# Patient Record
Sex: FEMALE | Race: WHITE | NOT HISPANIC OR LATINO | URBAN - METROPOLITAN AREA
[De-identification: names, ages, dates, MRNs, and addresses within clinical notes are randomized per-mention and may not be internally consistent; named-entity substitution may affect disease eponyms.]

---

## 2018-03-05 ENCOUNTER — EMERGENCY (EMERGENCY)
Facility: HOSPITAL | Age: 83
LOS: 0 days | Discharge: HOME | End: 2018-03-05
Attending: EMERGENCY MEDICINE

## 2018-03-05 VITALS
RESPIRATION RATE: 16 BRPM | HEART RATE: 83 BPM | SYSTOLIC BLOOD PRESSURE: 121 MMHG | OXYGEN SATURATION: 96 % | TEMPERATURE: 98 F | DIASTOLIC BLOOD PRESSURE: 68 MMHG

## 2018-03-05 VITALS
SYSTOLIC BLOOD PRESSURE: 161 MMHG | HEIGHT: 62 IN | DIASTOLIC BLOOD PRESSURE: 71 MMHG | HEART RATE: 81 BPM | RESPIRATION RATE: 20 BRPM | TEMPERATURE: 98 F | OXYGEN SATURATION: 97 % | WEIGHT: 134.92 LBS
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
## 2018-03-05 DIAGNOSIS — S42.142A DISPLACED FRACTURE OF GLENOID CAVITY OF SCAPULA, LEFT SHOULDER, INITIAL ENCOUNTER FOR CLOSED FRACTURE: ICD-10-CM

## 2018-03-05 DIAGNOSIS — S42.292A OTHER DISPLACED FRACTURE OF UPPER END OF LEFT HUMERUS, INITIAL ENCOUNTER FOR CLOSED FRACTURE: ICD-10-CM
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
## 2018-03-05 RX ORDER — MORPHINE SULFATE 50 MG/1
4 CAPSULE, EXTENDED RELEASE ORAL ONCE
Qty: 0 | Refills: 0 | Status: DISCONTINUED | OUTPATIENT
Start: 2018-03-05 | End: 2018-03-05

## 2018-03-05 RX ADMIN — MORPHINE SULFATE 4 MILLIGRAM(S): 50 CAPSULE, EXTENDED RELEASE ORAL at 08:56

## 2018-03-05 NOTE — ED PROVIDER NOTE - CARE PLAN
Principal Discharge DX:	Humerus head fracture  Secondary Diagnosis:	Glenoid fracture of shoulder, left, closed, initial encounter  Secondary Diagnosis:	Fall

## 2018-03-05 NOTE — ED ADULT TRIAGE NOTE - CHIEF COMPLAINT QUOTE
BIBA ems states "slip and fall complaining of left upper arm shoulder pain, down for three hours sitting on floor".

## 2018-03-05 NOTE — ED PROVIDER NOTE - PROGRESS NOTE DETAILS
hume Results of Xray and CT discussed with patient and sister in law. Patient will follow-up with Orthopedics for shoulder fx; follow-up with PMD for mass/ opacity. d/w orthopedics regarding humeral fracture. Will d/c home f/u out pt

## 2018-03-05 NOTE — ED PROVIDER NOTE - MEDICAL DECISION MAKING DETAILS
humeral fx noted, and hilar finding d/w pt  All incidental findings were discussed with patient and patient needs to follow-up with primary care physician for further management and treatment. All reports given to patient and/or family.

## 2018-03-05 NOTE — ED PROVIDER NOTE - OBJECTIVE STATEMENT
85 y/o female Melanoma R eye, legally blind presents to the ED s/p "I was walking to the bathroom this morning and I lost my footing and I fell injuring my left shoulder. I was unable to get due to pain. " no LOC/ head trauma/ dizziness/ CP/ SOB

## 2018-12-03 ENCOUNTER — OUTPATIENT (OUTPATIENT)
Dept: OUTPATIENT SERVICES | Facility: HOSPITAL | Age: 83
LOS: 1 days | Discharge: HOME | End: 2018-12-03

## 2018-12-03 DIAGNOSIS — H54.8 LEGAL BLINDNESS, AS DEFINED IN USA: ICD-10-CM

## 2018-12-03 DIAGNOSIS — Z00.00 ENCOUNTER FOR GENERAL ADULT MEDICAL EXAMINATION WITHOUT ABNORMAL FINDINGS: ICD-10-CM

## 2018-12-03 DIAGNOSIS — D72.829 ELEVATED WHITE BLOOD CELL COUNT, UNSPECIFIED: ICD-10-CM

## 2020-06-04 ENCOUNTER — EMERGENCY (EMERGENCY)
Facility: HOSPITAL | Age: 85
LOS: 1 days | Discharge: HOME | End: 2020-06-04
Attending: EMERGENCY MEDICINE | Admitting: EMERGENCY MEDICINE
Payer: MEDICARE

## 2020-06-04 VITALS
WEIGHT: 130.07 LBS | HEART RATE: 73 BPM | RESPIRATION RATE: 16 BRPM | HEIGHT: 63 IN | DIASTOLIC BLOOD PRESSURE: 77 MMHG | TEMPERATURE: 98 F | SYSTOLIC BLOOD PRESSURE: 123 MMHG | OXYGEN SATURATION: 100 %

## 2020-06-04 DIAGNOSIS — S09.90XA UNSPECIFIED INJURY OF HEAD, INITIAL ENCOUNTER: ICD-10-CM

## 2020-06-04 PROCEDURE — U0003: CPT

## 2020-06-04 PROCEDURE — 70450 CT HEAD/BRAIN W/O DYE: CPT | Mod: 26

## 2020-06-04 PROCEDURE — 70450 CT HEAD/BRAIN W/O DYE: CPT

## 2020-06-04 PROCEDURE — 99284 EMERGENCY DEPT VISIT MOD MDM: CPT | Mod: CS

## 2020-06-04 PROCEDURE — 99284 EMERGENCY DEPT VISIT MOD MDM: CPT | Mod: 25

## 2020-06-04 RX ORDER — SENNA PLUS 8.6 MG/1
1 TABLET ORAL AT BEDTIME
Refills: 0 | Status: DISCONTINUED | OUTPATIENT
Start: 2020-06-04 | End: 2020-06-04

## 2020-06-04 RX ORDER — METOPROLOL TARTRATE 50 MG
25 TABLET ORAL ONCE
Refills: 0 | Status: COMPLETED | OUTPATIENT
Start: 2020-06-04 | End: 2020-06-04

## 2020-06-04 RX ORDER — SENNA PLUS 8.6 MG/1
2 TABLET ORAL AT BEDTIME
Refills: 0 | Status: DISCONTINUED | OUTPATIENT
Start: 2020-06-04 | End: 2020-06-08

## 2020-06-04 RX ADMIN — Medication 25 MILLIGRAM(S): at 19:15

## 2020-06-04 RX ADMIN — SENNA PLUS 2 TABLET(S): 8.6 TABLET ORAL at 19:15

## 2020-06-04 NOTE — ED ADULT NURSE REASSESSMENT NOTE - NS ED NURSE REASSESS COMMENT FT1
Pt. stiletted X 3 throughout the evening. voiding clear yellow urine in bedpan. Pt hydrated and skin care given. VSS. Evening meds given as Rx.   Pt remains waiting for COVID test results for D/C back to long term facility.

## 2020-06-04 NOTE — ED ADULT NURSE NOTE - OBJECTIVE STATEMENT
Pt was BIBA s/p fall from wheelchair, EMS reports that assisted living staff reported left side head injury with no LOC. No obvious injury noted. No other reports of pain or injury to another area of body.

## 2020-06-04 NOTE — ED PROVIDER NOTE - CLINICAL SUMMARY MEDICAL DECISION MAKING FREE TEXT BOX
88 y/o F Melanoma R eye, legally blind, HTN, on ASA 81mg was BIB EMS from [facility] for head injury. Patient fell while trying to get out of her wheelchair, the staff noticed a bump on the left side of her head and sent to her to the ED. No LOC reported. PE as noted above. head CT pending. Covid swab sent 88 y/o F Melanoma R eye, legally blind, HTN, CVA on ASA 81mg was BIB EMS from [facility] for head injury. Patient fell while trying to get out of her wheelchair, the staff noticed a bump on the left side of her head and sent to her to the ED. No LOC reported. PE as noted above. head CT pending. Covid swab sent 88 y/o F Melanoma R eye, legally blind, HTN, CVA on ASA 81mg was BIB EMS from [facility] for head injury. Patient fell while trying to get out of her wheelchair, the staff noticed a bump on the left side of her head and sent to her to the ED. No LOC reported. PE as noted above. head CT negative Covid swab sent. awaiting results, prior to dispo back to NH

## 2020-06-04 NOTE — ED PROVIDER NOTE - ATTENDING CONTRIBUTION TO CARE
Wes with SANDRA Salvador. 88 y/o F Melanoma R eye, legally blind, HTN, on ASA 81mg was BIB EMS from [facility] for head injury. Patient fell while trying to get out of her wheelchair, the staff noticed a bump on the left side of her head and sent to her to the ED. No LOC reported. PE as noted above. head CT pending. Covid swab sent.   I performed a face to face bedside interview with patient regarding history of present illness, review of symptoms and past medical history. I completed an independent physical exam.  I have discussed the patient's plan of care with Physician Assistant (PA). I agree with note as stated above, having amended the EMR as needed to reflect my findings.   This includes History of Present Illness, HIV, Past Medical/Surgical/Family/Social History, Allergies and Home Medications, Review of Systems, Physical Exam, and any Progress Notes during the time I functioned as the attending physician for this patient.

## 2020-06-04 NOTE — GOALS OF CARE CONVERSATION - ADVANCED CARE PLANNING - CONVERSATION DETAILS
Pt. is sent to [facility] ED s/p fall at [facility]. Pt. has hx. of CVA, dementia. She does not have capacity to make medical decisions. Called HCP, sister in law Alejandra Monique ( who is also a ). Sister in Law states that she does not think it would improve patient's quality of life to attempt resuscitation or intubation. She gave verbal consent for MOLST: DNR/I.  Copy in chart, original will be sent to [facility] with patient.

## 2020-06-04 NOTE — ED ADULT TRIAGE NOTE - CHIEF COMPLAINT QUOTE
pt sent from [facility] for fall from standing position while trying to get out of wheelchair. pt hit left side of her head, on aspirin

## 2020-06-04 NOTE — ED ADULT NURSE REASSESSMENT NOTE - NS ED NURSE REASSESS COMMENT FT1
Pt. resting comfortably, safety/fall precautions maintained. Pt. awaiting for COVID test results and transfer back to facility.

## 2020-06-04 NOTE — ED PROVIDER NOTE - OBJECTIVE STATEMENT
88 y/o F Melanoma R eye, legally blind, HTN, on ASA 81mg was BIB EMS from [facility] for head injury. Patient fell while trying to get out of her wheelchair, the staff noticed a bump on the left side of her head and sent to her to the ED. No LOC reported. 88 y/o F Melanoma R eye, legally blind, HTN, CVA on ASA 81mg was BIB EMS from [facility] for head injury. Patient fell while trying to get out of her wheelchair, the staff noticed a bump on the left side of her head and sent to her to the ED. No LOC reported.

## 2020-06-04 NOTE — ED ADULT NURSE NOTE - NSIMPLEMENTINTERV_GEN_ALL_ED
Implemented All Fall with Harm Risk Interventions:  [place] to call system. Call bell, personal items and telephone within reach. Instruct patient to call for assistance. Room bathroom lighting operational. Non-slip footwear when patient is off stretcher. Physically safe environment: no spills, clutter or unnecessary equipment. Stretcher in lowest position, wheels locked, appropriate side rails in place. Provide visual cue, wrist band, yellow gown, etc. Monitor gait and stability. Monitor for mental status changes and reorient to person, place, and time. Review medications for side effects contributing to fall risk. Reinforce activity limits and safety measures with patient and family. Provide visual clues: red socks.

## 2020-06-04 NOTE — ED PROVIDER NOTE - PATIENT PORTAL LINK FT
You can access the FollowMyHealth Patient Portal offered by [facility] by registering at the following website: [email or web address]. By joining [employer]’s FollowMyHealth portal, you will also be able to view your health information using other applications (apps) compatible with our system.

## 2020-06-05 VITALS
DIASTOLIC BLOOD PRESSURE: 75 MMHG | SYSTOLIC BLOOD PRESSURE: 141 MMHG | OXYGEN SATURATION: 100 % | HEART RATE: 78 BPM | RESPIRATION RATE: 18 BRPM | TEMPERATURE: 98 F

## 2020-06-05 LAB — SARS-COV-2 RNA SPEC QL NAA+PROBE: SIGNIFICANT CHANGE UP

## 2020-06-05 NOTE — ED ADULT NURSE REASSESSMENT NOTE - NS ED NURSE REASSESS COMMENT FT1
Call made to [facility] for update on covid swab results. Swab arrived at laboratory at 2200. Swab still awaiting testing. Test itself with take 2.5 hours to complete. Laboratory aware that test is to be expedited.

## 2020-06-05 NOTE — ED ADULT NURSE REASSESSMENT NOTE - NS ED NURSE REASSESS COMMENT FT1
Patient given water, patient voided. Patient A&Ox2 at this time. Patient aware that she is awaiting test results before returning to facility. Patient adjusted in bed and made comfortable for sleeping. Posey alarm in place. Environment checked for safety.

## 2021-05-05 PROBLEM — Z00.00 ENCOUNTER FOR PREVENTIVE HEALTH EXAMINATION: Status: ACTIVE | Noted: 2021-05-05

## 2021-05-12 ENCOUNTER — APPOINTMENT (OUTPATIENT)
Dept: MRI IMAGING | Facility: HOSPITAL | Age: 86
End: 2021-05-12

## 2021-05-25 ENCOUNTER — APPOINTMENT (OUTPATIENT)
Dept: MRI IMAGING | Facility: HOSPITAL | Age: 86
End: 2021-05-25
Payer: MEDICARE

## 2021-05-25 ENCOUNTER — OUTPATIENT (OUTPATIENT)
Dept: OUTPATIENT SERVICES | Facility: HOSPITAL | Age: 86
LOS: 1 days | End: 2021-05-25
Payer: MEDICARE

## 2021-05-25 DIAGNOSIS — Z00.8 ENCOUNTER FOR OTHER GENERAL EXAMINATION: ICD-10-CM

## 2021-05-25 PROCEDURE — 72195 MRI PELVIS W/O DYE: CPT | Mod: 26,MH

## 2021-05-25 PROCEDURE — 72195 MRI PELVIS W/O DYE: CPT

## 2022-09-21 ENCOUNTER — INPATIENT (INPATIENT)
Facility: HOSPITAL | Age: 87
LOS: 4 days | Discharge: NURSING, REHAB OR LONG-TERM CARE | DRG: 640 | End: 2022-09-26
Attending: FAMILY MEDICINE | Admitting: STUDENT IN AN ORGANIZED HEALTH CARE EDUCATION/TRAINING PROGRAM
Payer: MEDICARE

## 2022-09-21 VITALS
RESPIRATION RATE: 18 BRPM | OXYGEN SATURATION: 97 % | SYSTOLIC BLOOD PRESSURE: 158 MMHG | TEMPERATURE: 98 F | HEART RATE: 77 BPM | HEIGHT: 63 IN | DIASTOLIC BLOOD PRESSURE: 75 MMHG

## 2022-09-21 DIAGNOSIS — I63.9 CEREBRAL INFARCTION, UNSPECIFIED: ICD-10-CM

## 2022-09-21 LAB
ALBUMIN SERPL ELPH-MCNC: 3.7 G/DL — SIGNIFICANT CHANGE UP (ref 3.3–5)
ALP SERPL-CCNC: 29 U/L — LOW (ref 40–120)
ALT FLD-CCNC: 15 U/L — SIGNIFICANT CHANGE UP (ref 10–45)
ANION GAP SERPL CALC-SCNC: 14 MMOL/L — SIGNIFICANT CHANGE UP (ref 5–17)
APPEARANCE UR: CLEAR — SIGNIFICANT CHANGE UP
APTT BLD: 30.2 SEC — SIGNIFICANT CHANGE UP (ref 27.5–35.5)
AST SERPL-CCNC: 26 U/L — SIGNIFICANT CHANGE UP (ref 10–40)
BACTERIA # UR AUTO: ABNORMAL /HPF
BASOPHILS # BLD AUTO: 0.03 K/UL — SIGNIFICANT CHANGE UP (ref 0–0.2)
BASOPHILS NFR BLD AUTO: 0.5 % — SIGNIFICANT CHANGE UP (ref 0–2)
BILIRUB SERPL-MCNC: 1.2 MG/DL — SIGNIFICANT CHANGE UP (ref 0.2–1.2)
BILIRUB UR-MCNC: NEGATIVE — SIGNIFICANT CHANGE UP
BUN SERPL-MCNC: 29 MG/DL — HIGH (ref 7–23)
CALCIUM SERPL-MCNC: 9.2 MG/DL — SIGNIFICANT CHANGE UP (ref 8.4–10.5)
CHLORIDE SERPL-SCNC: 99 MMOL/L — SIGNIFICANT CHANGE UP (ref 96–108)
CO2 SERPL-SCNC: 23 MMOL/L — SIGNIFICANT CHANGE UP (ref 22–31)
COLOR SPEC: YELLOW — SIGNIFICANT CHANGE UP
CREAT SERPL-MCNC: 0.98 MG/DL — SIGNIFICANT CHANGE UP (ref 0.5–1.3)
DIFF PNL FLD: ABNORMAL
EGFR: 55 ML/MIN/1.73M2 — LOW
EOSINOPHIL # BLD AUTO: 0.01 K/UL — SIGNIFICANT CHANGE UP (ref 0–0.5)
EOSINOPHIL NFR BLD AUTO: 0.2 % — SIGNIFICANT CHANGE UP (ref 0–6)
EPI CELLS # UR: SIGNIFICANT CHANGE UP
GLUCOSE BLDC GLUCOMTR-MCNC: 137 MG/DL — HIGH (ref 70–99)
GLUCOSE SERPL-MCNC: 65 MG/DL — LOW (ref 70–99)
GLUCOSE UR QL: 100 MG/DL
HCT VFR BLD CALC: 37.7 % — SIGNIFICANT CHANGE UP (ref 34.5–45)
HGB BLD-MCNC: 12.4 G/DL — SIGNIFICANT CHANGE UP (ref 11.5–15.5)
IMM GRANULOCYTES NFR BLD AUTO: 0.5 % — SIGNIFICANT CHANGE UP (ref 0–0.9)
INR BLD: 1 RATIO — SIGNIFICANT CHANGE UP (ref 0.88–1.16)
KETONES UR-MCNC: ABNORMAL
LEUKOCYTE ESTERASE UR-ACNC: ABNORMAL
LYMPHOCYTES # BLD AUTO: 1.02 K/UL — SIGNIFICANT CHANGE UP (ref 1–3.3)
LYMPHOCYTES # BLD AUTO: 17 % — SIGNIFICANT CHANGE UP (ref 13–44)
MCHC RBC-ENTMCNC: 32 PG — SIGNIFICANT CHANGE UP (ref 27–34)
MCHC RBC-ENTMCNC: 32.9 GM/DL — SIGNIFICANT CHANGE UP (ref 32–36)
MCV RBC AUTO: 97.2 FL — SIGNIFICANT CHANGE UP (ref 80–100)
MONOCYTES # BLD AUTO: 0.43 K/UL — SIGNIFICANT CHANGE UP (ref 0–0.9)
MONOCYTES NFR BLD AUTO: 7.2 % — SIGNIFICANT CHANGE UP (ref 2–14)
NEUTROPHILS # BLD AUTO: 4.47 K/UL — SIGNIFICANT CHANGE UP (ref 1.8–7.4)
NEUTROPHILS NFR BLD AUTO: 74.6 % — SIGNIFICANT CHANGE UP (ref 43–77)
NITRITE UR-MCNC: POSITIVE
NRBC # BLD: 0 /100 WBCS — SIGNIFICANT CHANGE UP (ref 0–0)
PH UR: 6.5 — SIGNIFICANT CHANGE UP (ref 5–8)
PLATELET # BLD AUTO: 207 K/UL — SIGNIFICANT CHANGE UP (ref 150–400)
POTASSIUM SERPL-MCNC: 4.1 MMOL/L — SIGNIFICANT CHANGE UP (ref 3.5–5.3)
POTASSIUM SERPL-SCNC: 4.1 MMOL/L — SIGNIFICANT CHANGE UP (ref 3.5–5.3)
PROT SERPL-MCNC: 7.2 G/DL — SIGNIFICANT CHANGE UP (ref 6–8.3)
PROT UR-MCNC: 100
PROTHROM AB SERPL-ACNC: 11.6 SEC — SIGNIFICANT CHANGE UP (ref 10.5–13.4)
RBC # BLD: 3.88 M/UL — SIGNIFICANT CHANGE UP (ref 3.8–5.2)
RBC # FLD: 13.2 % — SIGNIFICANT CHANGE UP (ref 10.3–14.5)
RBC CASTS # UR COMP ASSIST: ABNORMAL /HPF (ref 0–4)
SARS-COV-2 RNA SPEC QL NAA+PROBE: SIGNIFICANT CHANGE UP
SODIUM SERPL-SCNC: 136 MMOL/L — SIGNIFICANT CHANGE UP (ref 135–145)
SP GR SPEC: 1.01 — SIGNIFICANT CHANGE UP (ref 1.01–1.02)
TROPONIN I, HIGH SENSITIVITY RESULT: 7.9 NG/L — SIGNIFICANT CHANGE UP
UROBILINOGEN FLD QL: NEGATIVE — SIGNIFICANT CHANGE UP
WBC # BLD: 5.99 K/UL — SIGNIFICANT CHANGE UP (ref 3.8–10.5)
WBC # FLD AUTO: 5.99 K/UL — SIGNIFICANT CHANGE UP (ref 3.8–10.5)
WBC UR QL: ABNORMAL /HPF (ref 0–5)

## 2022-09-21 PROCEDURE — 70498 CT ANGIOGRAPHY NECK: CPT | Mod: 26,MA

## 2022-09-21 PROCEDURE — 93010 ELECTROCARDIOGRAM REPORT: CPT

## 2022-09-21 PROCEDURE — 70496 CT ANGIOGRAPHY HEAD: CPT | Mod: 26,MA

## 2022-09-21 PROCEDURE — 0042T: CPT | Mod: MA

## 2022-09-21 PROCEDURE — 99223 1ST HOSP IP/OBS HIGH 75: CPT

## 2022-09-21 PROCEDURE — 99285 EMERGENCY DEPT VISIT HI MDM: CPT

## 2022-09-21 RX ORDER — LEVOTHYROXINE SODIUM 125 MCG
0 TABLET ORAL
Qty: 0 | Refills: 0 | DISCHARGE

## 2022-09-21 RX ORDER — ASPIRIN/CALCIUM CARB/MAGNESIUM 324 MG
81 TABLET ORAL DAILY
Refills: 0 | Status: DISCONTINUED | OUTPATIENT
Start: 2022-09-21 | End: 2022-09-23

## 2022-09-21 RX ORDER — DEXTROSE 50 % IN WATER 50 %
50 SYRINGE (ML) INTRAVENOUS ONCE
Refills: 0 | Status: COMPLETED | OUTPATIENT
Start: 2022-09-21 | End: 2022-09-21

## 2022-09-21 RX ORDER — METOPROLOL TARTRATE 50 MG
12.5 TABLET ORAL
Refills: 0 | Status: DISCONTINUED | OUTPATIENT
Start: 2022-09-21 | End: 2022-09-26

## 2022-09-21 RX ORDER — SODIUM CHLORIDE 9 MG/ML
1000 INJECTION, SOLUTION INTRAVENOUS
Refills: 0 | Status: COMPLETED | OUTPATIENT
Start: 2022-09-21 | End: 2022-09-22

## 2022-09-21 RX ORDER — OXYBUTYNIN CHLORIDE 5 MG
0 TABLET ORAL
Qty: 0 | Refills: 0 | DISCHARGE

## 2022-09-21 RX ORDER — CELECOXIB 200 MG/1
0 CAPSULE ORAL
Qty: 0 | Refills: 0 | DISCHARGE

## 2022-09-21 RX ORDER — SENNA PLUS 8.6 MG/1
2 TABLET ORAL AT BEDTIME
Refills: 0 | Status: DISCONTINUED | OUTPATIENT
Start: 2022-09-21 | End: 2022-09-26

## 2022-09-21 RX ORDER — ASPIRIN/CALCIUM CARB/MAGNESIUM 324 MG
300 TABLET ORAL ONCE
Refills: 0 | Status: COMPLETED | OUTPATIENT
Start: 2022-09-21 | End: 2022-09-21

## 2022-09-21 RX ORDER — LEVOTHYROXINE SODIUM 125 MCG
75 TABLET ORAL DAILY
Refills: 0 | Status: DISCONTINUED | OUTPATIENT
Start: 2022-09-21 | End: 2022-09-23

## 2022-09-21 RX ORDER — ASPIRIN/CALCIUM CARB/MAGNESIUM 324 MG
1 TABLET ORAL
Qty: 0 | Refills: 0 | DISCHARGE

## 2022-09-21 RX ORDER — OXYBUTYNIN CHLORIDE 5 MG
5 TABLET ORAL
Refills: 0 | Status: DISCONTINUED | OUTPATIENT
Start: 2022-09-21 | End: 2022-09-26

## 2022-09-21 RX ORDER — METOPROLOL TARTRATE 50 MG
0 TABLET ORAL
Qty: 0 | Refills: 0 | DISCHARGE

## 2022-09-21 RX ADMIN — Medication 300 MILLIGRAM(S): at 19:21

## 2022-09-21 RX ADMIN — Medication 50 MILLILITER(S): at 15:27

## 2022-09-21 NOTE — PROVIDER CONTACT NOTE (HYPOGLYCEMIA EVENT) - NS PROVIDER CONTACT BACKGROUND-HYPO
Age: 89y    Gender: Female    POCT Blood Glucose:  119 mg/dL (09-21-22 @ 15:57)  53 mg/dL (09-21-22 @ 15:27)  62 mg/dL (09-21-22 @ 15:25)      eMAR:dextrose 50% Injectable   50 milliLiter(s) IV Push (09-21-22 @ 15:27)    Stroke code hypoglycemic. Medication administered as per MD order.

## 2022-09-21 NOTE — ED ADULT NURSE NOTE - CHIEF COMPLAINT QUOTE
Patient BIB EMS from NH with new onset AMS and aphasia, last known well 14:15. Finger stick 82 per EMS, 62 in triage and then 53 in CT scan. Code stroke called as per provider. Patient also had recent sodium imbalance per EMS.    *** AS PER [facility] - PATIENT LAST KNOWN WELL ACTUALLY 7:30AM ***

## 2022-09-21 NOTE — ED PROVIDER NOTE - CLINICAL SUMMARY MEDICAL DECISION MAKING FREE TEXT BOX
89 y f bib ems cc unable to speak not verbal as per mack RN in [facility]  last known well 7.30 am in the morning noted 11 am  fs 53 ms improved after d50 weakness R UL R LL persists ct brain cta head neck no new ischemia , no major blood vs blockage   dr cleary informed rectal asa pt failed dysphagia screen

## 2022-09-21 NOTE — H&P ADULT - NSHPPHYSICALEXAM_GEN_ALL_CORE
Vital Signs (24 Hrs):  T(C): 37.1 (09-21-22 @ 22:33), Max: 37.1 (09-21-22 @ 22:33)  HR: 110 (09-21-22 @ 22:33) (77 - 110)  BP: 159/92 (09-21-22 @ 22:33) (158/75 - 164/79)  RR: 18 (09-21-22 @ 22:33) (16 - 18)  SpO2: 97% (09-21-22 @ 22:33) (97% - 98%)  Daily Height in cm: 160.02 (21 Sep 2022 15:26)

## 2022-09-21 NOTE — ED ADULT NURSE REASSESSMENT NOTE - NS ED NURSE REASSESS COMMENT FT1
Received report from previous RN. Pt. at current baseline confused.  Pt. following some basic commands.  Speech slurred and incomprehensible.  Pt. pending admit orders.

## 2022-09-21 NOTE — ED ADULT NURSE NOTE - OBJECTIVE STATEMENT
Assumed pt care for a 89 yr old female alerted, brought in by EMS complaining of altered mental status since 240pm. As per EMS staff at [facility] report pt is able to speak and since then has not been able to speak. Pt has a GCS of 13, NIH of 2. Finger stick of 53. Meds given as per Order. Stroke code

## 2022-09-21 NOTE — H&P ADULT - NEUROLOGICAL
cranial nerves II-XII intact/sensation intact cranial nerves II-XII intact/sensation intact/responds to pain/responds to verbal commands/deep reflexes intact

## 2022-09-21 NOTE — ED PROVIDER NOTE - OBJECTIVE STATEMENT
89 y f philipp ems cc unable to speak not verbal as per mack RN in [facility]  last known well 7.30 am in the morning noted 11 am

## 2022-09-21 NOTE — ED ADULT TRIAGE NOTE - CHIEF COMPLAINT QUOTE
Patient BIB EMS from NH with new onset AMS and aphasia, last known well 14:15. Finger stick 82 per EMS, 62 in triage and then 53 in CT scan. Code stroke called as per provider. Patient also had recent sodium imbalance per EMS. Yes Patient BIB EMS from NH with new onset AMS and aphasia, last known well 14:15. Finger stick 82 per EMS, 62 in triage and then 53 in CT scan. Code stroke called as per provider. Patient also had recent sodium imbalance per EMS.    *** AS PER [facility] - PATIENT LAST KNOWN WELL ACTUALLY 7:30AM ***

## 2022-09-21 NOTE — H&P ADULT - HISTORY OF PRESENT ILLNESS
89 y/o F w/ HTN, Hypothyroidism, Dementia, BIBEMS, from NH because of altered MS, lethargy, not answering questions (pt usually verbal).  Pt was last known well at 7:30 AM.  FS was 82.  CT head, CTA head/neck w/ no acute stroke. While in the ED, pt noted to have FS  89 y/o F w/ HTN, Hypothyroidism, Dementia, BIBEMS, from NH because of altered MS, lethargy, not answering questions (pt usually verbal).  Pt was last known well at 7:30 AM.  FS was 82.  CT head, CTA head/neck w/ no acute stroke nor occlusion. While in the ED, pt noted to have FS of 53, was given an amp of D50.  Pt noted to be more responsive.  89 y/o F w/ HTN, Hypothyroidism, Dementia, BIBEMS, from NH because of altered MS, lethargy, not answering questions (pt usually verbal).  Pt was last known well at 7:30 AM.  FS was 82.  CT head, CTA head/neck w/ no acute stroke nor occlusion. While in the ED, pt noted to have FS of 53, was given an amp of D50.  Pt noted to be more responsive.   NIHSS score is 9. 89 y/o F w/ HTN, HLD, Hypothyroidism, Dementia, BIBEMS, from NH because of altered MS, lethargy, not answering questions (pt usually verbal).  Pt was last known well at 7:30 AM.  FS was 82.  CT head, CTA head/neck w/ no acute stroke nor occlusion. While in the ED, pt noted to have FS of 53, was given an amp of D50.  Pt noted to be more responsive.   NIHSS score is 9.

## 2022-09-21 NOTE — ED PROVIDER NOTE - PHYSICAL EXAMINATION
used General:     NAD, ill appearing confused elderly frail no distress  Head:     NC/AT, EOMI, oral mucosa moist  Neck:     trachea midline  Lungs:     CTA b/l, no w/r/r  CVS:     S1S2, RRR, no m/g/r  Abd:     +BS, s/nt/nd, no organomegaly  Ext:    2+ radial and pedal pulses, no c/c/e  Neuro:  lethargic drowsy responsive only to painful stimuli disoriented weakness R UL R LL   adter d50 iv pt awake minimally verbally responsive weakness R UL R LL persists

## 2022-09-21 NOTE — H&P ADULT - ASSESSMENT
89 y/o F w/ HTN, Hypothyroidism, Dementia, BIBEMS, from NH because of altered MS, lethargy, not answering questions (pt usually verbal).  Pt was last known well at 7:30 AM.  FS was 82.  CT head, CTA head/neck w/ no acute stroke nor occlusion. While in the ED, pt noted to have FS of 53, was given an amp of D50.  Pt noted to be more responsive.   Urinalysis +ve for nit, leuk est.      89 y/o F w/ HTN, Hypothyroidism, Dementia, BIBEMS, from NH because of altered MS, lethargy, not answering questions (pt usually verbal).  Pt was last known well at 7:30 AM.  FS was 82.  CT head, CTA head/neck w/ no acute stroke nor occlusion. While in the ED, pt noted to have FS of 53, was given an amp of D50.  Pt noted to be more responsive.   Urinalysis +ve for nit, leuk est.    Altered MS  CVA, hypoglycemia  Possible UTI  Pt with Dementia, hypertension, hypothyroidism    Admit  Neuro checks every 6 hours  Speech swallow eval, aspiration precautions  Gentle IV hydration  FFup labs  Cont ASA, statin 40 mg/day  Check lipid profile, TSH  Cont current meds: Metoprolol, levothyroxine  Empiric IV Ceftriaxone pending cultures  Neuro consult - Dr Kelli PENA - Pt has MOLST and is a DNR      89 y/o F w/ HTN, Hypothyroidism, Dementia, BIBEMS, from NH because of altered MS, lethargy, not answering questions (pt usually verbal).  Pt was last known well at 7:30 AM.  FS was 82.  CT head, CTA head/neck w/ no acute stroke nor occlusion. While in the ED, pt noted to have FS of 53, was given an amp of D50.  Pt noted to be more responsive.   Urinalysis +ve for nit, leuk est.    Altered MS  CVA, hypoglycemia  Possible UTI  Pt with Dementia, hypertension, hypothyroidism    Admit  Neuro checks every 6 hours  Speech swallow eval, aspiration precautions  Gentle IV hydration  FFup labs  Cont ASA, statin 40 mg/day  Check lipid profile, TSH  Cont current meds: Metoprolol, levothyroxine  Empiric IV Ceftriaxone pending cultures  Neuro consult   GOC - Pt has MOLST and is a DNR  DVT prophylaxis      89 y/o F w/ HTN, Hypothyroidism, Dementia, BIBEMS, from NH because of altered MS, lethargy, not answering questions (pt usually verbal).  Pt was last known well at 7:30 AM.  FS was 82.  CT head, CTA head/neck w/ no acute stroke nor occlusion. While in the ED, pt noted to have FS of 53, was given an amp of D50.  Pt noted to be more responsive.   Urinalysis +ve for nit, leuk est.    Altered MS  CVA, hypoglycemia  Possible UTI  Pt with Dementia, hypertension, hypothyroidism, HLD    Admit  Neuro checks every 6 hours  Speech swallow eval, aspiration precautions  Gentle IV hydration  FFup labs (pt had recent lipid panel)  Echo  Cont ASA, statin 40 mg/day  Cont current meds: Metoprolol, levothyroxine  Empiric IV Ceftriaxone pending cultures  Neuro consult   GOC - Pt has MOLST and is a DNR  DVT prophylaxis      89 y/o F w/ HTN, Hypothyroidism, Dementia, BIBEMS, from NH because of altered MS, lethargy, not answering questions (pt usually verbal).  Pt was last known well at 7:30 AM.  FS was 82.  CT head, CTA head/neck w/ no acute stroke nor occlusion. While in the ED, pt noted to have FS of 53, was given an amp of D50.  Pt noted to be more responsive.   Urinalysis +ve for nit, leuk est.    Altered MS  CVA, hypoglycemia  Possible UTI  Pt with Dementia, hypertension, hypothyroidism, HLD    Admit  Neuro checks every 6 hours  Speech swallow eval, aspiration precautions  Gentle IV hydration  FFup labs (pt had recent lipid panel), check TSH  Echo  Cont ASA, statin 40 mg/day  Cont current meds: Metoprolol, levothyroxine  Empiric IV Ceftriaxone pending cultures  Neuro consult   GOC - Pt has MOLST and is a DNR  DVT prophylaxis      89 y/o F w/ HTN, Hypothyroidism, Dementia, BIBEMS, from NH because of altered MS, lethargy, not answering questions (pt usually verbal).  Pt was last known well at 7:30 AM.  FS was 82.  CT head, CTA head/neck w/ no acute stroke nor occlusion. While in the ED, pt noted to have FS of 53, was given an amp of D50.  Pt noted to be more responsive.   Urinalysis +ve for nit, leuk est.    Altered MS  CVA, hypoglycemia  Possible UTI  Pt with Dementia, hypertension, hypothyroidism, HLD    Admit  Neuro checks every 6 hours  Speech swallow eval, aspiration precautions  Gentle IV hydration  FFup labs (pt had recent lipid panel), check TSH  Echo  Cont ASA, statin 40 mg/day  Cont current meds: Metoprolol, levothyroxine  Empiric IV Ceftriaxone pending cultures  Neuro consult - RDr Roosevelt - notified  GOC - Pt has MOLST and is a DNR  DVT prophylaxis

## 2022-09-22 LAB
ANION GAP SERPL CALC-SCNC: 13 MMOL/L — SIGNIFICANT CHANGE UP (ref 5–17)
BUN SERPL-MCNC: 29 MG/DL — HIGH (ref 7–23)
CALCIUM SERPL-MCNC: 8.9 MG/DL — SIGNIFICANT CHANGE UP (ref 8.4–10.5)
CHLORIDE SERPL-SCNC: 100 MMOL/L — SIGNIFICANT CHANGE UP (ref 96–108)
CO2 SERPL-SCNC: 24 MMOL/L — SIGNIFICANT CHANGE UP (ref 22–31)
CREAT SERPL-MCNC: 1.03 MG/DL — SIGNIFICANT CHANGE UP (ref 0.5–1.3)
EGFR: 52 ML/MIN/1.73M2 — LOW
GLUCOSE BLDC GLUCOMTR-MCNC: 129 MG/DL — HIGH (ref 70–99)
GLUCOSE SERPL-MCNC: 138 MG/DL — HIGH (ref 70–99)
HCT VFR BLD CALC: 40.7 % — SIGNIFICANT CHANGE UP (ref 34.5–45)
HGB BLD-MCNC: 13.6 G/DL — SIGNIFICANT CHANGE UP (ref 11.5–15.5)
MCHC RBC-ENTMCNC: 32 PG — SIGNIFICANT CHANGE UP (ref 27–34)
MCHC RBC-ENTMCNC: 33.4 GM/DL — SIGNIFICANT CHANGE UP (ref 32–36)
MCV RBC AUTO: 95.8 FL — SIGNIFICANT CHANGE UP (ref 80–100)
NRBC # BLD: 0 /100 WBCS — SIGNIFICANT CHANGE UP (ref 0–0)
PLATELET # BLD AUTO: 207 K/UL — SIGNIFICANT CHANGE UP (ref 150–400)
POTASSIUM SERPL-MCNC: 4 MMOL/L — SIGNIFICANT CHANGE UP (ref 3.5–5.3)
POTASSIUM SERPL-SCNC: 4 MMOL/L — SIGNIFICANT CHANGE UP (ref 3.5–5.3)
RBC # BLD: 4.25 M/UL — SIGNIFICANT CHANGE UP (ref 3.8–5.2)
RBC # FLD: 13.3 % — SIGNIFICANT CHANGE UP (ref 10.3–14.5)
SODIUM SERPL-SCNC: 137 MMOL/L — SIGNIFICANT CHANGE UP (ref 135–145)
TSH SERPL-MCNC: 9.39 UIU/ML — HIGH (ref 0.36–3.74)
WBC # BLD: 12.98 K/UL — HIGH (ref 3.8–10.5)
WBC # FLD AUTO: 12.98 K/UL — HIGH (ref 3.8–10.5)

## 2022-09-22 PROCEDURE — 93306 TTE W/DOPPLER COMPLETE: CPT | Mod: 26

## 2022-09-22 PROCEDURE — 99223 1ST HOSP IP/OBS HIGH 75: CPT

## 2022-09-22 PROCEDURE — 99233 SBSQ HOSP IP/OBS HIGH 50: CPT

## 2022-09-22 RX ORDER — ACETAMINOPHEN 500 MG
650 TABLET ORAL EVERY 6 HOURS
Refills: 0 | Status: DISCONTINUED | OUTPATIENT
Start: 2022-09-22 | End: 2022-09-26

## 2022-09-22 RX ORDER — ENOXAPARIN SODIUM 100 MG/ML
40 INJECTION SUBCUTANEOUS EVERY 24 HOURS
Refills: 0 | Status: DISCONTINUED | OUTPATIENT
Start: 2022-09-22 | End: 2022-09-26

## 2022-09-22 RX ORDER — ATORVASTATIN CALCIUM 80 MG/1
40 TABLET, FILM COATED ORAL AT BEDTIME
Refills: 0 | Status: DISCONTINUED | OUTPATIENT
Start: 2022-09-22 | End: 2022-09-23

## 2022-09-22 RX ORDER — SODIUM CHLORIDE 9 MG/ML
1000 INJECTION INTRAMUSCULAR; INTRAVENOUS; SUBCUTANEOUS
Refills: 0 | Status: DISCONTINUED | OUTPATIENT
Start: 2022-09-22 | End: 2022-09-23

## 2022-09-22 RX ORDER — INFLUENZA VIRUS VACCINE 15; 15; 15; 15 UG/.5ML; UG/.5ML; UG/.5ML; UG/.5ML
0.7 SUSPENSION INTRAMUSCULAR ONCE
Refills: 0 | Status: DISCONTINUED | OUTPATIENT
Start: 2022-09-22 | End: 2022-09-26

## 2022-09-22 RX ORDER — CEFTRIAXONE 500 MG/1
1000 INJECTION, POWDER, FOR SOLUTION INTRAMUSCULAR; INTRAVENOUS EVERY 24 HOURS
Refills: 0 | Status: COMPLETED | OUTPATIENT
Start: 2022-09-22 | End: 2022-09-24

## 2022-09-22 RX ADMIN — CEFTRIAXONE 100 MILLIGRAM(S): 500 INJECTION, POWDER, FOR SOLUTION INTRAMUSCULAR; INTRAVENOUS at 07:16

## 2022-09-22 RX ADMIN — SODIUM CHLORIDE 50 MILLILITER(S): 9 INJECTION, SOLUTION INTRAVENOUS at 01:03

## 2022-09-22 RX ADMIN — Medication 81 MILLIGRAM(S): at 14:32

## 2022-09-22 RX ADMIN — ENOXAPARIN SODIUM 40 MILLIGRAM(S): 100 INJECTION SUBCUTANEOUS at 14:33

## 2022-09-22 NOTE — PROGRESS NOTE ADULT - SUBJECTIVE AND OBJECTIVE BOX
Patient is a 89y old  Female who presents with a chief complaint of altered MS, lethargy (22 Sep 2022 08:44)      Patient seen and examined at bedside. No overnight events reported. Sugars are improved.     ALLERGIES:  No Known Allergies    MEDICATIONS  (STANDING):  aspirin enteric coated 81 milliGRAM(s) Oral daily  atorvastatin 40 milliGRAM(s) Oral at bedtime  cefTRIAXone   IVPB 1000 milliGRAM(s) IV Intermittent every 24 hours  enoxaparin Injectable 40 milliGRAM(s) SubCutaneous every 24 hours  influenza  Vaccine (HIGH DOSE) 0.7 milliLiter(s) IntraMuscular once  levothyroxine 75 MICROGram(s) Oral daily  metoprolol tartrate 12.5 milliGRAM(s) Oral two times a day  oxybutynin 5 milliGRAM(s) Oral two times a day  senna 2 Tablet(s) Oral at bedtime    MEDICATIONS  (PRN):  acetaminophen     Tablet .. 650 milliGRAM(s) Oral every 6 hours PRN Temp greater or equal to 38C (100.4F), Mild Pain (1 - 3)  bisacodyl Suppository 10 milliGRAM(s) Rectal daily PRN Constipation    Vital Signs Last 24 Hrs  T(F): 98.4 (22 Sep 2022 16:17), Max: 98.8 (21 Sep 2022 22:33)  HR: 86 (22 Sep 2022 16:17) (85 - 110)  BP: 153/65 (22 Sep 2022 16:17) (120/81 - 164/79)  RR: 16 (22 Sep 2022 16:17) (15 - 18)  SpO2: 99% (22 Sep 2022 16:17) (97% - 99%)  I&O's Summary    PHYSICAL EXAM:  General: lethargic, A/O x 3 (does respond appropriately to commands)  ENT: No gross hearing impairment, dry mucous membranes, no thrush  Neck: Supple, No JVD  Lungs: Clear to auscultation bilaterally limited to poor effort, good air entry, non-labored breathing  Cardio: RRR, S1/S2  Abdomen: Soft, Nontender, Nondistended; Bowel sounds present  Extremities: No calf tenderness, No cyanosis, cool feet (compared to rest of extremities) but strong DPs bilaterally, and findings are symmetric; No pitting edema      LABS:                        13.6   12.98 )-----------( 207      ( 22 Sep 2022 08:50 )             40.7         137  |  100  |  29  ----------------------------<  138  4.0   |  24  |  1.03    Ca    8.9      22 Sep 2022 08:50    TPro  7.2  /  Alb  3.7  /  TBili  1.2  /  DBili  x   /  AST  26  /  ALT  15  /  AlkPhos  29            PT/INR - ( 21 Sep 2022 15:35 )   PT: 11.6 sec;   INR: 1.00 ratio         PTT - ( 21 Sep 2022 15:35 )  PTT:30.2 sec      CARDIAC MARKERS ( 21 Sep 2022 15:35 )  x     / 7.9 ng/L / x     / x     / x            TSH 9.394   TSH with FT4 reflex --  Total T3 --              POCT Blood Glucose.: 129 mg/dL (22 Sep 2022 01:08)  POCT Blood Glucose.: 137 mg/dL (21 Sep 2022 19:17)      Urinalysis Basic - ( 21 Sep 2022 19:15 )    Color: Yellow / Appearance: Clear / S.010 / pH: x  Gluc: x / Ketone: Moderate  / Bili: Negative / Urobili: Negative   Blood: x / Protein: 100 / Nitrite: Positive   Leuk Esterase: Small / RBC: 11-25 /HPF / WBC 6-10 /HPF   Sq Epi: x / Non Sq Epi: Neg.-Few / Bacteria: Moderate /HPF        COVID-19 PCR: Alejo (22 @ 15:35)

## 2022-09-22 NOTE — PROGRESS NOTE ADULT - ASSESSMENT
89F with HTN, hypothyroidism, dementia, came from SNF due to AMS, found to have hypoglycemia    #Acute metabolic encephalopathy secondary to hypoglycemia  -Cause of hypoglycemia may be from dehydration and poor PO intkae  -c/w IVF  -Monitor FS   -Check A1C  -check insulin levels and C-peptide levels  -Exam and clinical picture not consistent with CVA - will need to clarify why patient on ASA on admission  -Neuro consult appreciated  -Check TSH  -Swallow eval noted: NPO except meds if lethargic, however, if more awake, can trial a conservative diet (puree) if patient able to tolerate until further recs by SLP team, if patient is lethargic then will only allow for meds with applesauce  -check EEG    #Hypothyroidism  -c/w synthroid  -follow-up TSH    #Possible UTI  -empiric antibiotics  -follow-up cultures    #Essential HTN  -c/w Lopressor    DVT ppx: Lovenox

## 2022-09-22 NOTE — ED ADULT NURSE REASSESSMENT NOTE - NS ED NURSE REASSESS COMMENT FT1
Pt incontinent of urine, partial bed bath provided, linen changed. Pt repositioned for comfort. Echocardiogram in progress.

## 2022-09-22 NOTE — SWALLOW BEDSIDE ASSESSMENT ADULT - COMMENTS
WBC: 12.98  head CT 9/21: Right parietal corona radiata hypodensity (1.8 cm), new since   6/4/2020. This is nonspecific but likely reflects interval chronic   infarct. Consider further reduction MRI if there is clinical suspicion   for small acute infarct superimposed on chronic changes.

## 2022-09-22 NOTE — PATIENT PROFILE ADULT - FALL HARM RISK - HARM RISK INTERVENTIONS
Assistance with ambulation/Assistance OOB with selected safe patient handling equipment/Communicate Risk of Fall with Harm to all staff/Discuss with provider need for PT consult/Monitor gait and stability/Reinforce activity limits and safety measures with patient and family/Tailored Fall Risk Interventions/Visual Cue: Yellow wristband and red socks/Bed in lowest position, wheels locked, appropriate side rails in place/Call bell, personal items and telephone in reach/Instruct patient to call for assistance before getting out of bed or chair/Non-slip footwear when patient is out of bed/[place] to call system/Physically safe environment - no spills, clutter or unnecessary equipment/Purposeful Proactive Rounding/Room/bathroom lighting operational, light cord in reach

## 2022-09-22 NOTE — ED ADULT NURSE REASSESSMENT NOTE - NS ED NURSE REASSESS COMMENT FT1
Peripheral IV in right AC leaking and no longer patent, removed. IV placed #20 left forearm, IVF running. Will continue to monitor. Peripheral IV in right AC leaking and no longer patent, removed. IV placed #20 left forearm. Will continue to monitor.

## 2022-09-22 NOTE — ED ADULT NURSE REASSESSMENT NOTE - NS ED NURSE REASSESS COMMENT FT1
Dr. Caraballo aware of tachycardia through the night and pt.s NPO status.  Right now we will continue to monitor.  Pt. much more active at this time.

## 2022-09-22 NOTE — SWALLOW BEDSIDE ASSESSMENT ADULT - PHARYNGEAL PHASE
cued cough wet in nature/Multiple swallows cued cough wet in nature/Complaints of pharyngeal stasis/Multiple swallows

## 2022-09-22 NOTE — SWALLOW BEDSIDE ASSESSMENT ADULT - SWALLOW EVAL: DIAGNOSIS
Patient presents with evidence of oropharyngeal dysphagia in the setting of AMS. Able to achieve and maintain alertness given tactile and verbal cues. Reduced acceptance and stripping from utensil. Adequate anterior containment with intermittent suspected oral holding versus delay in swallow initiation. Pharyngeal motor response appreciated via palpation with multiple (4-6), audible swallows noted. Cued cough subjectively weak and wet in nature.

## 2022-09-22 NOTE — PATIENT PROFILE ADULT - NSPROHMSYMPCOND_GEN_A_NUR
Problem: Fall Risk (Adult)  Goal: Identify Related Risk Factors and Signs and Symptoms  Outcome: Ongoing (interventions implemented as appropriate)       none

## 2022-09-22 NOTE — SWALLOW BEDSIDE ASSESSMENT ADULT - SLP PERTINENT HISTORY OF CURRENT PROBLEM
89 y/o F w/ HTN, HLD, Hypothyroidism, Dementia, BIBEMS, from NH because of altered MS, lethargy, not answering questions (pt usually verbal).  Pt was last known well at 7:30 AM.  FS was 82.  CT head, CTA head/neck w/ no acute stroke nor occlusion. While in the ED, pt noted to have FS of 53, was given an amp of D50.  Pt noted to be more responsive.

## 2022-09-23 LAB
A1C WITH ESTIMATED AVERAGE GLUCOSE RESULT: 5 % — SIGNIFICANT CHANGE UP (ref 4–5.6)
A1C WITH ESTIMATED AVERAGE GLUCOSE RESULT: 5.1 % — SIGNIFICANT CHANGE UP (ref 4–5.6)
ALBUMIN SERPL ELPH-MCNC: 3.3 G/DL — SIGNIFICANT CHANGE UP (ref 3.3–5)
ALP SERPL-CCNC: 22 U/L — LOW (ref 40–120)
ALT FLD-CCNC: 10 U/L — SIGNIFICANT CHANGE UP (ref 10–45)
ANION GAP SERPL CALC-SCNC: 11 MMOL/L — SIGNIFICANT CHANGE UP (ref 5–17)
ANION GAP SERPL CALC-SCNC: 8 MMOL/L — SIGNIFICANT CHANGE UP (ref 5–17)
APTT BLD: 26.4 SEC — LOW (ref 27.5–35.5)
AST SERPL-CCNC: 27 U/L — SIGNIFICANT CHANGE UP (ref 10–40)
BASOPHILS # BLD AUTO: 0.02 K/UL — SIGNIFICANT CHANGE UP (ref 0–0.2)
BASOPHILS # BLD AUTO: 0.03 K/UL — SIGNIFICANT CHANGE UP (ref 0–0.2)
BASOPHILS NFR BLD AUTO: 0.2 % — SIGNIFICANT CHANGE UP (ref 0–2)
BASOPHILS NFR BLD AUTO: 0.3 % — SIGNIFICANT CHANGE UP (ref 0–2)
BILIRUB SERPL-MCNC: 0.8 MG/DL — SIGNIFICANT CHANGE UP (ref 0.2–1.2)
BUN SERPL-MCNC: 25 MG/DL — HIGH (ref 7–23)
BUN SERPL-MCNC: 29 MG/DL — HIGH (ref 7–23)
C PEPTIDE SERPL-MCNC: 0.9 NG/ML — LOW (ref 1.1–4.4)
CALCIUM SERPL-MCNC: 8.7 MG/DL — SIGNIFICANT CHANGE UP (ref 8.4–10.5)
CALCIUM SERPL-MCNC: 8.7 MG/DL — SIGNIFICANT CHANGE UP (ref 8.4–10.5)
CHLORIDE SERPL-SCNC: 100 MMOL/L — SIGNIFICANT CHANGE UP (ref 96–108)
CHLORIDE SERPL-SCNC: 104 MMOL/L — SIGNIFICANT CHANGE UP (ref 96–108)
CK MB BLD-MCNC: 1.7 % — SIGNIFICANT CHANGE UP (ref 0–3.5)
CK MB CFR SERPL CALC: 2 NG/ML — SIGNIFICANT CHANGE UP (ref 0.5–10)
CK SERPL-CCNC: 119 U/L — SIGNIFICANT CHANGE UP (ref 25–170)
CO2 SERPL-SCNC: 28 MMOL/L — SIGNIFICANT CHANGE UP (ref 22–31)
CO2 SERPL-SCNC: 28 MMOL/L — SIGNIFICANT CHANGE UP (ref 22–31)
CREAT SERPL-MCNC: 0.83 MG/DL — SIGNIFICANT CHANGE UP (ref 0.5–1.3)
CREAT SERPL-MCNC: 1.04 MG/DL — SIGNIFICANT CHANGE UP (ref 0.5–1.3)
CULTURE RESULTS: NO GROWTH — SIGNIFICANT CHANGE UP
EGFR: 51 ML/MIN/1.73M2 — LOW
EGFR: 66 ML/MIN/1.73M2 — SIGNIFICANT CHANGE UP
EOSINOPHIL # BLD AUTO: 0.02 K/UL — SIGNIFICANT CHANGE UP (ref 0–0.5)
EOSINOPHIL # BLD AUTO: 0.05 K/UL — SIGNIFICANT CHANGE UP (ref 0–0.5)
EOSINOPHIL NFR BLD AUTO: 0.2 % — SIGNIFICANT CHANGE UP (ref 0–6)
EOSINOPHIL NFR BLD AUTO: 0.6 % — SIGNIFICANT CHANGE UP (ref 0–6)
ESTIMATED AVERAGE GLUCOSE: 100 MG/DL — SIGNIFICANT CHANGE UP (ref 68–114)
ESTIMATED AVERAGE GLUCOSE: 97 MG/DL — SIGNIFICANT CHANGE UP (ref 68–114)
GLUCOSE BLDC GLUCOMTR-MCNC: 96 MG/DL — SIGNIFICANT CHANGE UP (ref 70–99)
GLUCOSE SERPL-MCNC: 112 MG/DL — HIGH (ref 70–99)
GLUCOSE SERPL-MCNC: 97 MG/DL — SIGNIFICANT CHANGE UP (ref 70–99)
HCT VFR BLD CALC: 35.9 % — SIGNIFICANT CHANGE UP (ref 34.5–45)
HCT VFR BLD CALC: 38.2 % — SIGNIFICANT CHANGE UP (ref 34.5–45)
HGB BLD-MCNC: 11.9 G/DL — SIGNIFICANT CHANGE UP (ref 11.5–15.5)
HGB BLD-MCNC: 12.7 G/DL — SIGNIFICANT CHANGE UP (ref 11.5–15.5)
IMM GRANULOCYTES NFR BLD AUTO: 0.3 % — SIGNIFICANT CHANGE UP (ref 0–0.9)
IMM GRANULOCYTES NFR BLD AUTO: 0.4 % — SIGNIFICANT CHANGE UP (ref 0–0.9)
INR BLD: 0.99 RATIO — SIGNIFICANT CHANGE UP (ref 0.88–1.16)
LACTATE SERPL-SCNC: 0.7 MMOL/L — SIGNIFICANT CHANGE UP (ref 0.7–2)
LYMPHOCYTES # BLD AUTO: 1.02 K/UL — SIGNIFICANT CHANGE UP (ref 1–3.3)
LYMPHOCYTES # BLD AUTO: 1.03 K/UL — SIGNIFICANT CHANGE UP (ref 1–3.3)
LYMPHOCYTES # BLD AUTO: 10 % — LOW (ref 13–44)
LYMPHOCYTES # BLD AUTO: 11.9 % — LOW (ref 13–44)
MAGNESIUM SERPL-MCNC: 2 MG/DL — SIGNIFICANT CHANGE UP (ref 1.6–2.6)
MCHC RBC-ENTMCNC: 31.6 PG — SIGNIFICANT CHANGE UP (ref 27–34)
MCHC RBC-ENTMCNC: 32 PG — SIGNIFICANT CHANGE UP (ref 27–34)
MCHC RBC-ENTMCNC: 33.1 GM/DL — SIGNIFICANT CHANGE UP (ref 32–36)
MCHC RBC-ENTMCNC: 33.2 GM/DL — SIGNIFICANT CHANGE UP (ref 32–36)
MCV RBC AUTO: 95.5 FL — SIGNIFICANT CHANGE UP (ref 80–100)
MCV RBC AUTO: 96.2 FL — SIGNIFICANT CHANGE UP (ref 80–100)
MONOCYTES # BLD AUTO: 0.75 K/UL — SIGNIFICANT CHANGE UP (ref 0–0.9)
MONOCYTES # BLD AUTO: 0.78 K/UL — SIGNIFICANT CHANGE UP (ref 0–0.9)
MONOCYTES NFR BLD AUTO: 7.3 % — SIGNIFICANT CHANGE UP (ref 2–14)
MONOCYTES NFR BLD AUTO: 9.1 % — SIGNIFICANT CHANGE UP (ref 2–14)
NEUTROPHILS # BLD AUTO: 6.7 K/UL — SIGNIFICANT CHANGE UP (ref 1.8–7.4)
NEUTROPHILS # BLD AUTO: 8.38 K/UL — HIGH (ref 1.8–7.4)
NEUTROPHILS NFR BLD AUTO: 77.9 % — HIGH (ref 43–77)
NEUTROPHILS NFR BLD AUTO: 81.8 % — HIGH (ref 43–77)
NRBC # BLD: 0 /100 WBCS — SIGNIFICANT CHANGE UP (ref 0–0)
NRBC # BLD: 0 /100 WBCS — SIGNIFICANT CHANGE UP (ref 0–0)
PHOSPHATE SERPL-MCNC: 3 MG/DL — SIGNIFICANT CHANGE UP (ref 2.5–4.5)
PLATELET # BLD AUTO: 200 K/UL — SIGNIFICANT CHANGE UP (ref 150–400)
PLATELET # BLD AUTO: 211 K/UL — SIGNIFICANT CHANGE UP (ref 150–400)
POTASSIUM SERPL-MCNC: 3.5 MMOL/L — SIGNIFICANT CHANGE UP (ref 3.5–5.3)
POTASSIUM SERPL-MCNC: 3.9 MMOL/L — SIGNIFICANT CHANGE UP (ref 3.5–5.3)
POTASSIUM SERPL-SCNC: 3.5 MMOL/L — SIGNIFICANT CHANGE UP (ref 3.5–5.3)
POTASSIUM SERPL-SCNC: 3.9 MMOL/L — SIGNIFICANT CHANGE UP (ref 3.5–5.3)
PROT SERPL-MCNC: 7 G/DL — SIGNIFICANT CHANGE UP (ref 6–8.3)
PROTHROM AB SERPL-ACNC: 11.5 SEC — SIGNIFICANT CHANGE UP (ref 10.5–13.4)
RBC # BLD: 3.76 M/UL — LOW (ref 3.8–5.2)
RBC # BLD: 3.97 M/UL — SIGNIFICANT CHANGE UP (ref 3.8–5.2)
RBC # FLD: 13.3 % — SIGNIFICANT CHANGE UP (ref 10.3–14.5)
RBC # FLD: 13.5 % — SIGNIFICANT CHANGE UP (ref 10.3–14.5)
SODIUM SERPL-SCNC: 139 MMOL/L — SIGNIFICANT CHANGE UP (ref 135–145)
SODIUM SERPL-SCNC: 140 MMOL/L — SIGNIFICANT CHANGE UP (ref 135–145)
SPECIMEN SOURCE: SIGNIFICANT CHANGE UP
T3 SERPL-MCNC: 44 NG/DL — LOW (ref 80–200)
T4 AB SER-ACNC: 5.9 UG/DL — SIGNIFICANT CHANGE UP (ref 4.6–12)
TROPONIN I, HIGH SENSITIVITY RESULT: 21.6 NG/L — SIGNIFICANT CHANGE UP
WBC # BLD: 10.25 K/UL — SIGNIFICANT CHANGE UP (ref 3.8–10.5)
WBC # BLD: 8.6 K/UL — SIGNIFICANT CHANGE UP (ref 3.8–10.5)
WBC # FLD AUTO: 10.25 K/UL — SIGNIFICANT CHANGE UP (ref 3.8–10.5)
WBC # FLD AUTO: 8.6 K/UL — SIGNIFICANT CHANGE UP (ref 3.8–10.5)

## 2022-09-23 PROCEDURE — 70450 CT HEAD/BRAIN W/O DYE: CPT | Mod: 26

## 2022-09-23 PROCEDURE — 99232 SBSQ HOSP IP/OBS MODERATE 35: CPT

## 2022-09-23 PROCEDURE — 71045 X-RAY EXAM CHEST 1 VIEW: CPT | Mod: 26

## 2022-09-23 PROCEDURE — 99233 SBSQ HOSP IP/OBS HIGH 50: CPT | Mod: FS

## 2022-09-23 RX ORDER — LEVOTHYROXINE SODIUM 125 MCG
88 TABLET ORAL DAILY
Refills: 0 | Status: DISCONTINUED | OUTPATIENT
Start: 2022-09-24 | End: 2022-09-26

## 2022-09-23 RX ORDER — ASPIRIN/CALCIUM CARB/MAGNESIUM 324 MG
1 TABLET ORAL
Qty: 0 | Refills: 0 | DISCHARGE

## 2022-09-23 RX ORDER — DOCUSATE SODIUM 100 MG
1 CAPSULE ORAL
Qty: 0 | Refills: 0 | DISCHARGE

## 2022-09-23 RX ORDER — SENNA PLUS 8.6 MG/1
2 TABLET ORAL
Qty: 0 | Refills: 0 | DISCHARGE

## 2022-09-23 RX ORDER — SODIUM CHLORIDE 9 MG/ML
1000 INJECTION, SOLUTION INTRAVENOUS
Refills: 0 | Status: DISCONTINUED | OUTPATIENT
Start: 2022-09-23 | End: 2022-09-25

## 2022-09-23 RX ORDER — METOPROLOL TARTRATE 50 MG
0.5 TABLET ORAL
Qty: 0 | Refills: 0 | DISCHARGE

## 2022-09-23 RX ORDER — LOTEPREDNOL ETABONATE 2 MG/ML
1 SUSPENSION/ DROPS OPHTHALMIC
Qty: 0 | Refills: 0 | DISCHARGE

## 2022-09-23 RX ORDER — DICLOFENAC SODIUM 30 MG/G
2 GEL TOPICAL
Qty: 0 | Refills: 0 | DISCHARGE

## 2022-09-23 RX ORDER — ATORVASTATIN CALCIUM 80 MG/1
40 TABLET, FILM COATED ORAL AT BEDTIME
Refills: 0 | Status: DISCONTINUED | OUTPATIENT
Start: 2022-09-23 | End: 2022-09-26

## 2022-09-23 RX ORDER — ASPIRIN/CALCIUM CARB/MAGNESIUM 324 MG
81 TABLET ORAL DAILY
Refills: 0 | Status: DISCONTINUED | OUTPATIENT
Start: 2022-09-23 | End: 2022-09-26

## 2022-09-23 RX ORDER — OXYBUTYNIN CHLORIDE 5 MG
1 TABLET ORAL
Qty: 0 | Refills: 0 | DISCHARGE

## 2022-09-23 RX ORDER — DOCUSATE SODIUM 100 MG
0 CAPSULE ORAL
Qty: 0 | Refills: 0 | DISCHARGE

## 2022-09-23 RX ADMIN — Medication 10 MILLIGRAM(S): at 14:21

## 2022-09-23 RX ADMIN — Medication 5 MILLIGRAM(S): at 05:59

## 2022-09-23 RX ADMIN — Medication 75 MICROGRAM(S): at 05:59

## 2022-09-23 RX ADMIN — CEFTRIAXONE 100 MILLIGRAM(S): 500 INJECTION, POWDER, FOR SOLUTION INTRAMUSCULAR; INTRAVENOUS at 08:41

## 2022-09-23 RX ADMIN — Medication 81 MILLIGRAM(S): at 13:17

## 2022-09-23 RX ADMIN — Medication 12.5 MILLIGRAM(S): at 05:59

## 2022-09-23 NOTE — PROVIDER CONTACT NOTE (CHANGE IN STATUS NOTIFICATION) - BACKGROUND
Pt admitted for cerebral infarction. Speech and swallow f/u evaluation. Pt communicating clearly and following command and suddenly became aphasic, unable to speak

## 2022-09-23 NOTE — EEG REPORT - NS EEG TEXT BOX
DAPHNE LIMON [id number] 89y (20-May-1931)F  Admitting MD: Dr. Tram Lockhart    Study Date: 09-23-22    --------------------------------------------------------------------------------------------------  History:  CC/ HPI Patient is a 89y old  Female who presents with a chief complaint of altered MS, lethargy (23 Sep 2022 08:04)    aspirin  chewable 81 milliGRAM(s) Oral daily  cefTRIAXone   IVPB 1000 milliGRAM(s) IV Intermittent every 24 hours  enoxaparin Injectable 40 milliGRAM(s) SubCutaneous every 24 hours  influenza  Vaccine (HIGH DOSE) 0.7 milliLiter(s) IntraMuscular once  levothyroxine 75 MICROGram(s) Oral daily  metoprolol tartrate 12.5 milliGRAM(s) Oral two times a day  oxybutynin 5 milliGRAM(s) Oral two times a day  senna 2 Tablet(s) Oral at bedtime  sodium chloride 0.9%. 1000 milliLiter(s) IV Continuous <Continuous>    --------------------------------------------------------------------------------------------------  Study Interpretation:    [[[Abbreviation Key:  PDR=alpha rhythm/posterior dominant rhythm. A-P=anterior posterior gradient.  Amplitude: ‘very low’:<20; ‘low’:20-50; ‘medium’:; ‘high’:>200uV.  Persistence for periodic/rhythmic patterns (% of epoch) ‘rare’:<1%; ‘occasional’:1-10%; ‘frequent’:10-50%; ‘abundant’:50-90%; ‘continuous’:>90%.  Persistence for sporadic discharges: ‘rare’:<1/hr; ‘occasional’:1/min-1/hr; ‘frequent’:>1/min; ‘abundant’:>1/10 sec.  GRDA=generalized rhythmic delta activity; FIRDA=frontal intermittent GRDA; LRDA=lateralized rhythmic delta activity; TIRDA=temporal intermittent rhythmic delta activity;  LPD=PLED=lateralized periodic discharges; GPD=generalized periodic discharges; BiPDs=BiPLEDs=bilateral independent periodic epileptiform discharges; SIRPID=stimulus induced rhythmic, periodic, or ictal appearing discharges; BIRDs=brief potentially ictal rhythmic discharges >4 Hz, lasting .5-10s; PFA (paroxysmal bursts >13 Hz or =8 Hz).  Modifiers: +F=with fast component; +S=with spike component; +R=with rhythmic component.  S-B=burst suppression pattern.  Max=maximal. N1-drowsy; N2-stage II sleep; N3-slow wave sleep. SSS/BETS=small sharp spikes/benign epileptiform transients of sleep. HV=hyperventilation; PS=photic stimulation]]]    FINDINGS:  The background was continuous, spontaneously variable and reactive.  During wakefulness, there is no discernable PDR.     Background Slowing:  Generalized slowing: diffuse polymorphic theta/delta slowing.   Focal slowing: none was present.     Sleep Background:  Drowsiness was characterized by fragmentation, attenuation, and slowing of the background activity.        Epileptiform Activity:   No interictal epileptiform discharges were present.    Events:  No clinical events were recorded.  No seizures were recorded.    Activation Procedures:   Hyperventilation was not performed.    Photic stimulation was performed and did not elicit any abnormalities.      Artifacts:  Intermittent myogenic and external motion artifacts were noted.    ECG:  The heart rate on single channel ECG at baseline was predominantly near BPM =60-70   -----------------------------------------------------------------------------------------------------    EEG Classification / Summary:  Abormal EEG study, awake / drowsy     - Background slowing, generalized,  mild     -----------------------------------------------------------------------------------------------------    Clinical Impression:    - Mild diffuse/multifocal cerebral dysfunction.   - There were no epileptiform abnormalities recorded.      This is a preliminary read     -------------------------------------------------------------------------------------------------------  [facility] EEG Reading Room Ph#: [phone]  Epilepsy Answering Service after 5PM and before 8:30AM: Ph#: [phone]      Felipe Rubio MD   Epilepsy Fellow, [facility]	   DAPHNE LIMON [id number] 89y (20-May-1931)F  Admitting MD: Dr. Tram Lockhart    Study Date: 09-23-22    --------------------------------------------------------------------------------------------------  History:  CC/ HPI Patient is a 89y old  Female who presents with a chief complaint of altered MS, lethargy (23 Sep 2022 08:04)    aspirin  chewable 81 milliGRAM(s) Oral daily  cefTRIAXone   IVPB 1000 milliGRAM(s) IV Intermittent every 24 hours  enoxaparin Injectable 40 milliGRAM(s) SubCutaneous every 24 hours  influenza  Vaccine (HIGH DOSE) 0.7 milliLiter(s) IntraMuscular once  levothyroxine 75 MICROGram(s) Oral daily  metoprolol tartrate 12.5 milliGRAM(s) Oral two times a day  oxybutynin 5 milliGRAM(s) Oral two times a day  senna 2 Tablet(s) Oral at bedtime  sodium chloride 0.9%. 1000 milliLiter(s) IV Continuous <Continuous>    --------------------------------------------------------------------------------------------------  Study Interpretation:    [[[Abbreviation Key:  PDR=alpha rhythm/posterior dominant rhythm. A-P=anterior posterior gradient.  Amplitude: ‘very low’:<20; ‘low’:20-50; ‘medium’:; ‘high’:>200uV.  Persistence for periodic/rhythmic patterns (% of epoch) ‘rare’:<1%; ‘occasional’:1-10%; ‘frequent’:10-50%; ‘abundant’:50-90%; ‘continuous’:>90%.  Persistence for sporadic discharges: ‘rare’:<1/hr; ‘occasional’:1/min-1/hr; ‘frequent’:>1/min; ‘abundant’:>1/10 sec.  GRDA=generalized rhythmic delta activity; FIRDA=frontal intermittent GRDA; LRDA=lateralized rhythmic delta activity; TIRDA=temporal intermittent rhythmic delta activity;  LPD=PLED=lateralized periodic discharges; GPD=generalized periodic discharges; BiPDs=BiPLEDs=bilateral independent periodic epileptiform discharges; SIRPID=stimulus induced rhythmic, periodic, or ictal appearing discharges; BIRDs=brief potentially ictal rhythmic discharges >4 Hz, lasting .5-10s; PFA (paroxysmal bursts >13 Hz or =8 Hz).  Modifiers: +F=with fast component; +S=with spike component; +R=with rhythmic component.  S-B=burst suppression pattern.  Max=maximal. N1-drowsy; N2-stage II sleep; N3-slow wave sleep. SSS/BETS=small sharp spikes/benign epileptiform transients of sleep. HV=hyperventilation; PS=photic stimulation]]]    FINDINGS:  The background was continuous, spontaneously variable and reactive.  During wakefulness, there is no discernable PDR.     Background Slowing:  Generalized slowing: diffuse polymorphic theta/delta slowing.   Focal slowing: none was present.     Sleep Background:  Drowsiness was characterized by fragmentation, attenuation, and slowing of the background activity.        Epileptiform Activity:   No interictal epileptiform discharges were present.    Events:  No clinical events were recorded.  No seizures were recorded.    Activation Procedures:   Hyperventilation was not performed.    Photic stimulation was performed and did not elicit any abnormalities.      Artifacts:  Intermittent myogenic and external motion artifacts were noted.    ECG:  The heart rate on single channel ECG at baseline was predominantly near BPM =60-70   -----------------------------------------------------------------------------------------------------    EEG Classification / Summary:  Abormal EEG study, awake / drowsy     - Background slowing, generalized,  mild     -----------------------------------------------------------------------------------------------------    Clinical Impression:    - Mild diffuse/multifocal cerebral dysfunction.   - There were no epileptiform abnormalities recorded.      This is a preliminary read     -------------------------------------------------------------------------------------------------------  [facility] EEG Reading Room Ph#: [phone]  Epilepsy Answering Service after 5PM and before 8:30AM: Ph#: [phone]    Felipe Rubio MD   Epilepsy Fellow, [facility]	    Mendoza Wilson MD PhD  Director, Epilepsy Division, [facility]  DAPHNE LIMON [id number] 89y (20-May-1931)F  Admitting MD: Dr. Tram Lockhart    Study Date: 09-23-22    --------------------------------------------------------------------------------------------------  History:  CC/ HPI Patient is a 89y old  Female who presents with a chief complaint of altered MS, lethargy (23 Sep 2022 08:04)    aspirin  chewable 81 milliGRAM(s) Oral daily  cefTRIAXone   IVPB 1000 milliGRAM(s) IV Intermittent every 24 hours  enoxaparin Injectable 40 milliGRAM(s) SubCutaneous every 24 hours  influenza  Vaccine (HIGH DOSE) 0.7 milliLiter(s) IntraMuscular once  levothyroxine 75 MICROGram(s) Oral daily  metoprolol tartrate 12.5 milliGRAM(s) Oral two times a day  oxybutynin 5 milliGRAM(s) Oral two times a day  senna 2 Tablet(s) Oral at bedtime  sodium chloride 0.9%. 1000 milliLiter(s) IV Continuous <Continuous>    --------------------------------------------------------------------------------------------------  Study Interpretation:    [[[Abbreviation Key:  PDR=alpha rhythm/posterior dominant rhythm. A-P=anterior posterior gradient.  Amplitude: ‘very low’:<20; ‘low’:20-50; ‘medium’:; ‘high’:>200uV.  Persistence for periodic/rhythmic patterns (% of epoch) ‘rare’:<1%; ‘occasional’:1-10%; ‘frequent’:10-50%; ‘abundant’:50-90%; ‘continuous’:>90%.  Persistence for sporadic discharges: ‘rare’:<1/hr; ‘occasional’:1/min-1/hr; ‘frequent’:>1/min; ‘abundant’:>1/10 sec.  GRDA=generalized rhythmic delta activity; FIRDA=frontal intermittent GRDA; LRDA=lateralized rhythmic delta activity; TIRDA=temporal intermittent rhythmic delta activity;  LPD=PLED=lateralized periodic discharges; GPD=generalized periodic discharges; BiPDs=BiPLEDs=bilateral independent periodic epileptiform discharges; SIRPID=stimulus induced rhythmic, periodic, or ictal appearing discharges; BIRDs=brief potentially ictal rhythmic discharges >4 Hz, lasting .5-10s; PFA (paroxysmal bursts >13 Hz or =8 Hz).  Modifiers: +F=with fast component; +S=with spike component; +R=with rhythmic component.  S-B=burst suppression pattern.  Max=maximal. N1-drowsy; N2-stage II sleep; N3-slow wave sleep. SSS/BETS=small sharp spikes/benign epileptiform transients of sleep. HV=hyperventilation; PS=photic stimulation]]]    FINDINGS:  The background was continuous, spontaneously variable and reactive.  During wakefulness, there is no discernable PDR.     Background Slowing:  Generalized slowing: diffuse polymorphic theta/delta slowing.   Focal slowing: none was present.     Sleep Background:  Drowsiness was characterized by fragmentation, attenuation, and slowing of the background activity.        Epileptiform Activity:   No interictal epileptiform discharges were present.    Events:  No clinical events were recorded.  No seizures were recorded.    Activation Procedures:   Hyperventilation was not performed.    Photic stimulation was performed and did not elicit any abnormalities.      Artifacts:  Intermittent myogenic and external motion artifacts were noted.    ECG:  The heart rate on single channel ECG at baseline was predominantly near BPM =60-70   -----------------------------------------------------------------------------------------------------    EEG Classification / Summary:  Abormal EEG study, awake / drowsy     - Background slowing, generalized,  mild     -----------------------------------------------------------------------------------------------------    Clinical Impression:    - Mild diffuse/multifocal cerebral dysfunction.   - There were no epileptiform abnormalities recorded.      -------------------------------------------------------------------------------------------------------  [facility] EEG Reading Room Ph#: [phone]  Epilepsy Answering Service after 5PM and before 8:30AM: Ph#: [phone]    Felipe Rubio MD   Epilepsy Fellow, [facility]	    Mendoza Wilson MD PhD  Director, Epilepsy Division, [facility]

## 2022-09-23 NOTE — DIETITIAN NUTRITION RISK NOTIFICATION - TREATMENT: THE FOLLOWING DIET HAS BEEN RECOMMENDED
Diet, NPO:      Special Instructions for Nursing:  CODE STROKE; NPO until Dysphagia screen or Speech Bedside Swallow Evaluation completed and passed (09-23-22 @ 13:48) [Active]

## 2022-09-23 NOTE — PROGRESS NOTE ADULT - SUBJECTIVE AND OBJECTIVE BOX
Patient is a 89y old  Female who presents with a chief complaint of Cerebral infarction      Patient seen and examined at bedside. No overnight reported events with no complaints this am.   at 1330 called by nurse for acute AMS while undergoing speech and swallow exam. Code stroke called.     ALLERGIES:  No Known Allergies    MEDICATIONS  (STANDING):  aspirin  chewable 81 milliGRAM(s) Oral daily  atorvastatin 40 milliGRAM(s) Oral at bedtime  cefTRIAXone   IVPB 1000 milliGRAM(s) IV Intermittent every 24 hours  dextrose 5% + sodium chloride 0.9%. 1000 milliLiter(s) (50 mL/Hr) IV Continuous <Continuous>  enoxaparin Injectable 40 milliGRAM(s) SubCutaneous every 24 hours  influenza  Vaccine (HIGH DOSE) 0.7 milliLiter(s) IntraMuscular once  levothyroxine 75 MICROGram(s) Oral daily  metoprolol tartrate 12.5 milliGRAM(s) Oral two times a day  oxybutynin 5 milliGRAM(s) Oral two times a day  senna 2 Tablet(s) Oral at bedtime    MEDICATIONS  (PRN):  acetaminophen     Tablet .. 650 milliGRAM(s) Oral every 6 hours PRN Temp greater or equal to 38C (100.4F), Mild Pain (1 - 3)  bisacodyl Suppository 10 milliGRAM(s) Rectal daily PRN Constipation    Vital Signs Last 24 Hrs  T(F): 97.4 (23 Sep 2022 13:41), Max: 98.4 (22 Sep 2022 16:17)  HR: 116 (23 Sep 2022 14:21) (79 - 116)  BP: 131/99 (23 Sep 2022 14:21) (120/64 - 177/79)  RR: 18 (23 Sep 2022 14:21) (16 - 18)  SpO2: 99% (23 Sep 2022 14:21) (97% - 100%)  I&O's Summary    22 Sep 2022 07:  -  23 Sep 2022 07:00  --------------------------------------------------------  IN: 0 mL / OUT: 300 mL / NET: -300 mL    23 Sep 2022 07:01  -  23 Sep 2022 15:49  --------------------------------------------------------  IN: 0 mL / OUT: 200 mL / NET: -200 mL      PHYSICAL EXAM:  General: Elderly, confused, A/O x 2  ENT: MMM, no oral thrush   Neck: Supple, No JVD  Lungs: Clear to auscultation bilaterally, non labored breathing  Cardio: RRR, S1/S2, No murmurs  Abdomen: Soft, Nontender, Nondistended; Bowel sounds present  Extremities: No calf tenderness, No pitting edema    LABS:                        12.7   10.25 )-----------( 211      ( 23 Sep 2022 14:40 )             38.2         139  |  100  |  25  ----------------------------<  112  3.5   |  28  |  1.04    Ca    8.7      23 Sep 2022 14:40  Phos  3.0       Mg     2.0         TPro  7.0  /  Alb  3.3  /  TBili  0.8  /  DBili  x   /  AST  27  /  ALT  10  /  AlkPhos  22        PT/INR - ( 23 Sep 2022 14:40 )   PT: 11.5 sec;   INR: 0.99 ratio         PTT - ( 23 Sep 2022 14:40 )  PTT:26.4 sec  Lactate, Blood: 0.7 mmol/L ( @ 05:30)    CARDIAC MARKERS ( 23 Sep 2022 14:40 )  x     / 21.6 ng/L / 119 U/L / x     / 2.0 ng/mL  CARDIAC MARKERS ( 21 Sep 2022 15:35 )  x     / 7.9 ng/L / x     / x     / x            TSH --   TSH with FT4 reflex --  Total T3 44          96 (23 Sep 2022 14:57)      POCT Blood Glucose.: 96 mg/dL (23 Sep 2022 13:39)      Urinalysis Basic - ( 21 Sep 2022 19:15 )    Color: Yellow / Appearance: Clear / S.010 / pH: x  Gluc: x / Ketone: Moderate  / Bili: Negative / Urobili: Negative   Blood: x / Protein: 100 / Nitrite: Positive   Leuk Esterase: Small / RBC: 11-25 /HPF / WBC 6-10 /HPF   Sq Epi: x / Non Sq Epi: Neg.-Few / Bacteria: Moderate /HPF        Culture - Urine (collected 21 Sep 2022 19:15)  Source: Catheterized Catheterized  Final Report (23 Sep 2022 10:49):    No growth      COVID-19 PCR: NotDetec (22 @ 15:35)      RADIOLOGY & ADDITIONAL TESTS:    < from: CT Brain Stroke Protocol (22 @ 14:03) >  IMPRESSION:  DEGRADED BY MOTION. NO EVIDENCE OF AN ACUTE INTRACRANIAL HEMORRHAGE,   MIDLINE SHIFT, OR HYDROCEPHALUS. CHRONIC SMALL RIGHT PARIETAL INFARCT.   CHRONIC LACUNAR INFARCT LEFT THALAMUS. PATCHY WHITE MATTER ISCHEMIC   CHANGES. NO SIGNIFICANT INTERVAL CHANGE FROM PRIOR    Findings discussed with ANJEL Layne at 2:17 PM on 2022    --- End of Report ---            PRISCILA GANDARA MD; Attending Radiologist  This document has been electronically signed. Sep 23 2022  2:17PM    < end of copied text >  < from: CT Brain Perfusion Maps Stroke (22 @ 15:48) >    IMPRESSION:    CTA brain:  1.  No proximal large vessel arterial occlusion, flow-limiting stenosis   or dissection within the brain.    CTA neck:  2. No arterial occlusion or flow-limiting stenosis within the neck   (within the limitation that the aortic arch and great vessel origins are   not imaged).    CT perfusion:  3.  No acute core ischemic infarct or critically hypoperfused tissue at   risk (within limitations of slightly degraded examination). If there is   continued clinical suspicion for evolving acute cerebral ischemia,   considerfurther evaluation with MRI.        Findings were discussed with covering ED provider (SANDRA Grewal)   via telephone on 2022 at 3:56 PM with verbal read back.    --- End of Report ---            BISHNU DAVIDSON MD; Attending Radiologist  Thisdocument has been electronically signed. Sep 21 2022  4:16PM    < end of copied text >    Care Discussed with Consultants/Other Providers:   Neurology Dr. Albert    Patient is a 89y old  Female who presents with a chief complaint of Cerebral infarction      Patient seen and examined at bedside. No overnight reported events with no complaints this am.   at 1330 called by nurse for acute AMS while undergoing speech and swallow exam. Code stroke called.     ALLERGIES:  No Known Allergies    MEDICATIONS  (STANDING):  aspirin  chewable 81 milliGRAM(s) Oral daily  atorvastatin 40 milliGRAM(s) Oral at bedtime  cefTRIAXone   IVPB 1000 milliGRAM(s) IV Intermittent every 24 hours  dextrose 5% + sodium chloride 0.9%. 1000 milliLiter(s) (50 mL/Hr) IV Continuous <Continuous>  enoxaparin Injectable 40 milliGRAM(s) SubCutaneous every 24 hours  influenza  Vaccine (HIGH DOSE) 0.7 milliLiter(s) IntraMuscular once  levothyroxine 75 MICROGram(s) Oral daily  metoprolol tartrate 12.5 milliGRAM(s) Oral two times a day  oxybutynin 5 milliGRAM(s) Oral two times a day  senna 2 Tablet(s) Oral at bedtime    MEDICATIONS  (PRN):  acetaminophen     Tablet .. 650 milliGRAM(s) Oral every 6 hours PRN Temp greater or equal to 38C (100.4F), Mild Pain (1 - 3)  bisacodyl Suppository 10 milliGRAM(s) Rectal daily PRN Constipation    Vital Signs Last 24 Hrs  T(F): 97.4 (23 Sep 2022 13:41), Max: 98.4 (22 Sep 2022 16:17)  HR: 116 (23 Sep 2022 14:21) (79 - 116)  BP: 131/99 (23 Sep 2022 14:21) (120/64 - 177/79)  RR: 18 (23 Sep 2022 14:21) (16 - 18)  SpO2: 99% (23 Sep 2022 14:21) (97% - 100%)  I&O's Summary    22 Sep 2022 07:  -  23 Sep 2022 07:00  --------------------------------------------------------  IN: 0 mL / OUT: 300 mL / NET: -300 mL    23 Sep 2022 07:01  -  23 Sep 2022 15:49  --------------------------------------------------------  IN: 0 mL / OUT: 200 mL / NET: -200 mL      PHYSICAL EXAM:  General: Elderly, confused, A/O x 2  ENT: MMM, no oral thrush   Neck: Supple, No JVD  Lungs: Clear to auscultation bilaterally, non labored breathing  Cardio: RRR, S1/S2, No murmurs  Abdomen: Soft, Nontender, Nondistended; Bowel sounds present  Extremities: No calf tenderness, No pitting edema    LABS:                        12.7   10.25 )-----------( 211      ( 23 Sep 2022 14:40 )             38.2         139  |  100  |  25  ----------------------------<  112  3.5   |  28  |  1.04    Ca    8.7      23 Sep 2022 14:40  Phos  3.0       Mg     2.0         TPro  7.0  /  Alb  3.3  /  TBili  0.8  /  DBili  x   /  AST  27  /  ALT  10  /  AlkPhos  22        PT/INR - ( 23 Sep 2022 14:40 )   PT: 11.5 sec;   INR: 0.99 ratio         PTT - ( 23 Sep 2022 14:40 )  PTT:26.4 sec  Lactate, Blood: 0.7 mmol/L ( @ 05:30)    CARDIAC MARKERS ( 23 Sep 2022 14:40 )  x     / 21.6 ng/L / 119 U/L / x     / 2.0 ng/mL  CARDIAC MARKERS ( 21 Sep 2022 15:35 )  x     / 7.9 ng/L / x     / x     / x            TSH --   TSH with FT4 reflex --  Total T3 44          96 (23 Sep 2022 14:57)      POCT Blood Glucose.: 96 mg/dL (23 Sep 2022 13:39)      Urinalysis Basic - ( 21 Sep 2022 19:15 )    Color: Yellow / Appearance: Clear / S.010 / pH: x  Gluc: x / Ketone: Moderate  / Bili: Negative / Urobili: Negative   Blood: x / Protein: 100 / Nitrite: Positive   Leuk Esterase: Small / RBC: 11-25 /HPF / WBC 6-10 /HPF   Sq Epi: x / Non Sq Epi: Neg.-Few / Bacteria: Moderate /HPF        Culture - Urine (collected 21 Sep 2022 19:15)  Source: Catheterized Catheterized  Final Report (23 Sep 2022 10:49):    No growth      COVID-19 PCR: NotDetec (22 @ 15:35)      RADIOLOGY & ADDITIONAL TESTS:    < from: CT Brain Stroke Protocol (22 @ 14:03) >  IMPRESSION:  DEGRADED BY MOTION. NO EVIDENCE OF AN ACUTE INTRACRANIAL HEMORRHAGE,   MIDLINE SHIFT, OR HYDROCEPHALUS. CHRONIC SMALL RIGHT PARIETAL INFARCT.   CHRONIC LACUNAR INFARCT LEFT THALAMUS. PATCHY WHITE MATTER ISCHEMIC   CHANGES. NO SIGNIFICANT INTERVAL CHANGE FROM PRIOR    Findings discussed with ANJEL Layne at 2:17 PM on 2022    --- End of Report ---            PRISCILA GANDARA MD; Attending Radiologist  This document has been electronically signed. Sep 23 2022  2:17PM    < end of copied text >  < from: CT Brain Perfusion Maps Stroke (22 @ 15:48) >    IMPRESSION:    CTA brain:  1.  No proximal large vessel arterial occlusion, flow-limiting stenosis   or dissection within the brain.    CTA neck:  2. No arterial occlusion or flow-limiting stenosis within the neck   (within the limitation that the aortic arch and great vessel origins are   not imaged).    CT perfusion:  3.  No acute core ischemic infarct or critically hypoperfused tissue at   risk (within limitations of slightly degraded examination). If there is   continued clinical suspicion for evolving acute cerebral ischemia,   considerfurther evaluation with MRI.        Findings were discussed with covering ED provider (SANDRA Grewal)   via telephone on 2022 at 3:56 PM with verbal read back.    --- End of Report ---            BISHNU DAVIDSON MD; Attending Radiologist  Thisdocument has been electronically signed. Sep 21 2022  4:16PM    < end of copied text >        Clinical Impression:    - Mild diffuse/multifocal cerebral dysfunction.   - There were no epileptiform abnormalities recorded.      This is a preliminary read     -------------------------------------------------------------------------------------------------------  [facility] EEG Reading Room Ph#: [phone]  Epilepsy Answering Service after 5PM and before 8:30AM: Ph#: [phone]      Felipe Rubio MD   Epilepsy Fellow, [facility] [place]	        Electronic Signatures:  Don Martinez)  (Signature Pending)  	Co-Signer: EEG REPORT  Felipe Rubio)  (Signed 23-Sep-2022 13:18)  	Authored: EEG REPORT      Last Updated: 23-Sep-2022 13:18 by Felipe Rubio)  Care Discussed with Consultants/Other Providers:   Neurology Dr. Albert

## 2022-09-23 NOTE — DIETITIAN INITIAL EVALUATION ADULT - ORAL INTAKE PTA/DIET HISTORY
Pt admitted from SNF where she was receiving regular diet with thin liquids + Ensure Enlive TID. Pt unable to provide additional hx due to AMS. NKFA per chart review.

## 2022-09-23 NOTE — DIETITIAN INITIAL EVALUATION ADULT - ADD RECOMMEND
1. Diet consistency per SLP   2. Add Ensure High Protein TID  3. Total feeding assistance when pt alert

## 2022-09-23 NOTE — PROGRESS NOTE ADULT - SUBJECTIVE AND OBJECTIVE BOX
Neurology Progress Note    Subjective & Objective: Much improved and probably at or near  baseline.    On exam alert attentive   oriented speech intact.  CN unremarkable and motor non focal.          Vital Signs Last 24 Hrs  T(C): 36.7 (23 Sep 2022 04:54), Max: 36.9 (22 Sep 2022 16:17)  T(F): 98 (23 Sep 2022 04:54), Max: 98.4 (22 Sep 2022 16:17)  HR: 79 (23 Sep 2022 04:54) (79 - 98)  BP: 135/66 (23 Sep 2022 04:54) (120/64 - 153/65)  BP(mean): --  RR: 16 (23 Sep 2022 04:54) (16 - 16)  SpO2: 97% (23 Sep 2022 04:54) (97% - 99%)    Parameters below as of 23 Sep 2022 04:54  Patient On (Oxygen Delivery Method): room air                           Lab:  09-23    140  |  104  |  29<H>  ----------------------------<  97  3.9   |  28  |  0.83    Ca    8.7      23 Sep 2022 05:30  Phos  3.0     09-23  Mg     2.0     09-23    TPro  7.2  /  Alb  3.7  /  TBili  1.2  /  DBili  x   /  AST  26  /  ALT  15  /  AlkPhos  29<L>  09-21    LIVER FUNCTIONS - ( 21 Sep 2022 15:35 )  Alb: 3.7 g/dL / Pro: 7.2 g/dL / ALK PHOS: 29 U/L / ALT: 15 U/L / AST: 26 U/L / GGT: x                                   11.9   8.60  )-----------( 200      ( 23 Sep 2022 05:30 )             35.9         Radiology: CT                      MRI  EEG:      MEDICATIONS  (STANDING):  aspirin enteric coated 81 milliGRAM(s) Oral daily  atorvastatin 40 milliGRAM(s) Oral at bedtime  cefTRIAXone   IVPB 1000 milliGRAM(s) IV Intermittent every 24 hours  enoxaparin Injectable 40 milliGRAM(s) SubCutaneous every 24 hours  influenza  Vaccine (HIGH DOSE) 0.7 milliLiter(s) IntraMuscular once  levothyroxine 75 MICROGram(s) Oral daily  metoprolol tartrate 12.5 milliGRAM(s) Oral two times a day  oxybutynin 5 milliGRAM(s) Oral two times a day  senna 2 Tablet(s) Oral at bedtime  sodium chloride 0.9%. 1000 milliLiter(s) (50 mL/Hr) IV Continuous <Continuous>    MEDICATIONS  (PRN):  acetaminophen     Tablet .. 650 milliGRAM(s) Oral every 6 hours PRN Temp greater or equal to 38C (100.4F), Mild Pain (1 - 3)  bisacodyl Suppository 10 milliGRAM(s) Rectal daily PRN Constipation

## 2022-09-23 NOTE — DIETITIAN INITIAL EVALUATION ADULT - PERTINENT LABORATORY DATA
09-23    140  |  104  |  29<H>  ----------------------------<  97  3.9   |  28  |  0.83    Ca    8.7      23 Sep 2022 05:30  Phos  3.0     09-23  Mg     2.0     09-23    TPro  7.2  /  Alb  3.7  /  TBili  1.2  /  DBili  x   /  AST  26  /  ALT  15  /  AlkPhos  29<L>  09-21  A1C with Estimated Average Glucose Result: 5.1 % (09-23-22 @ 05:30)

## 2022-09-23 NOTE — PROGRESS NOTE ADULT - ASSESSMENT
Impression:  89 yo female with dementia admitted with acute confusion but non focal.  CT head reveals chronic microvascular changes and CT angiogram and perfusion unremarkable. Hypoglycemic in ED. Neuro exam is non focal and suggestive of dementia and a metabolic encephalopathy.  R/O CVA but presentation is non focal. This AM much improved and probably at baseline.      REC: EEG pending otherwise defer further neuro w/u.

## 2022-09-23 NOTE — DIETITIAN INITIAL EVALUATION ADULT - HEIGHT FOR BMI (CENTIMETERS)
"Chief Complaint   Patient presents with     Wheezing     Cough       Initial /77   Pulse 98   Temp 95.9  F (35.5  C) (Tympanic)   Ht 4' 8\" (1.422 m)   Wt 119 lb (54 kg)   SpO2 98%   BMI 26.68 kg/m   Estimated body mass index is 26.68 kg/m  as calculated from the following:    Height as of this encounter: 4' 8\" (1.422 m).    Weight as of this encounter: 119 lb (54 kg).  Medication Reconciliation: complete    CASSIE Tyson MA    " 160.02

## 2022-09-23 NOTE — PROGRESS NOTE ADULT - ASSESSMENT
89F with HTN, hypothyroidism, dementia, came from SNF due to AMS, found to have hypoglycemia    #Code Stroke r/o CVA  #Acute Encephalopathy  -Stat CT head, unable to complete CTA head and neck due to patient restlessness     #Acute metabolic encephalopathy secondary to hypoglycemia vs UTI vs TIA  -Cause of hypoglycemia may be from dehydration and poor PO intake  -c/w IVF, patient NPO, until dysphagia screen passed  -Monitor FS   -A1C 5.1  -check insulin levels and C-peptide levels  -Exam and clinical picture not consistent with CVA - will need to clarify why patient on ASA on admission  -Neuro consult appreciated  -TSH elevated   -Swallow eval noted: NPO except meds if lethargic, however, if more awake, can trial a conservative diet (puree) if patient able to tolerate until further recs by SLP team, if patient is lethargic then will only allow for meds with applesauce  -check EEG      #Hypothyroidism  -c/w synthroid  -TSH elevated  -T3 low, T4 high     #Acute UTI  -Continue Ceftriaxone  -Urine culture NG    #Essential HTN  -c/w Lopressor    DVT ppx: Lovenox   89F with HTN, hypothyroidism, dementia, came from SNF due to AMS, found to have hypoglycemia    #Code Stroke r/o CVA  #Acute Metabolic Encephalopathy  -Stat CT head no evidence of hemorrhage or acute infarct. Discussed with Dr Butler   -unable to complete CTA head and neck due to patient restlessness   -Discussed case with neuro Fellow Dr. Blas. Given no focalized findings. No TPA.   -MR head   -FU labs   -tele monitoring  -Case discussed with neurology Dr. Albert       #Acute metabolic encephalopathy secondary to hypoglycemia vs UTI vs TIA (resolved in am )   -Cause of hypoglycemia may be from dehydration and poor PO intake  -c/w IVF, patient NPO, until dysphagia screen passed  -Monitor FS   -A1C 5.1  -check insulin levels and C-peptide levels  -Exam and clinical picture not consistent with CVA - will need to clarify why patient on ASA on admission  -Neuro consult appreciated  -TSH elevated   -Swallow eval noted: NPO except meds if lethargic, however, if more awake, can reattempt dysphagia screen and give a conservative diet (puree) if patient able to tolerate until further recs by SLP team, if patient is lethargic then will only allow for meds with applesauce  -EEG noted above       #Hypothyroidism  -increased synthroid to 88mcg  -TSH elevated  -T3 low, T4 high   -FU T4    #Acute UTI  -Continue Ceftriaxone  -Urine culture NG    #Essential HTN  -c/w Lopressor    DVT ppx: Lovenox    Updated HCP Alejandra [phone] 89F with HTN, hypothyroidism, dementia, came from SNF due to AMS, found to have hypoglycemia    #Code Stroke r/o CVA  #Acute Metabolic Encephalopathy  -Stat CT head no evidence of hemorrhage or acute infarct. Discussed with Dr Butler   -unable to complete CTA head and neck due to patient restlessness   -Discussed case with neuro Fellow Dr. Blas. Given no focalized findings. No TPA.   -MR head   -FU labs -will check lipid profil  per stroke protocol  -MR head monday, neuro checks q4 with vs monitoring  -tele monitoring  -Case discussed with neurology Dr. Albert       #Acute metabolic encephalopathy secondary to hypoglycemia vs UTI vs TIA (resolved in am )   -Cause of hypoglycemia may be from dehydration and poor PO intake  -c/w IVF, patient NPO, until dysphagia screen passed  -Monitor FS   -A1C 5.1  -check insulin levels and C-peptide levels  -Exam and clinical picture not consistent with CVA - will need to clarify why patient on ASA on admission  -Neuro consult appreciated  -TSH elevated   -Swallow eval noted: NPO except meds if lethargic, however, if more awake, can reattempt dysphagia screen and give a conservative diet (puree) if patient able to tolerate until further recs by SLP team, if patient is lethargic then will only allow for meds with applesauce  -EEG noted above       #Hypothyroidism  -increased synthroid to 88mcg  -TSH elevated  -T3 low, T4 high   -FU free T4  -apprec endocrine consult and recs    #Acute UTI  -Continue Ceftriaxone  -Urine culture NG    #Essential HTN  -c/w Lopressor    DVT ppx: Lovenox    Updated HCP Alejandra [phone] 89F with HTN, hypothyroidism, dementia, came from SNF due to AMS, found to have hypoglycemia    #Code Stroke r/o CVA  #Acute Metabolic Encephalopathy  -Stat CT head no evidence of hemorrhage or acute infarct. Discussed with Dr Butler   -unable to complete CTA head and neck due to patient restlessness   -Discussed case with neuro Fellow Dr. Blas. Given no focalized findings. No TPA.   -MR head. (Paper work will need to be filled out when family available to answer screening questions)  -FU labs -will check lipid profile  per stroke protocol  -MR head monday, neuro checks q4 with vs monitoring  -tele monitoring  -Case discussed with neurology Dr. Albert       #Acute metabolic encephalopathy secondary to hypoglycemia vs UTI vs TIA (resolved in am )   -Cause of hypoglycemia may be from dehydration and poor PO intake  -c/w IVF, patient NPO, until dysphagia screen passed  -Monitor FS   -A1C 5.1  -check insulin levels and C-peptide levels  -Exam and clinical picture not consistent with CVA - will need to clarify why patient on ASA on admission  -Neuro consult appreciated  -TSH elevated   -Swallow eval noted: NPO except meds if lethargic, however, if more awake, can reattempt dysphagia screen and give a conservative diet (puree) if patient able to tolerate until further recs by SLP team, if patient is lethargic then will only allow for meds with applesauce  -EEG noted above       #Hypothyroidism  -increased synthroid to 88mcg  -TSH elevated  -T3 low, T4 high   -FU free T4  -apprec endocrine consult and recs    #Acute UTI  -Continue Ceftriaxone  -Urine culture NG    #Essential HTN  -c/w Lopressor    DVT ppx: Lovenox    Updated HCP Alejandra [phone]

## 2022-09-23 NOTE — PROVIDER CONTACT NOTE (CHANGE IN STATUS NOTIFICATION) - ACTION/TREATMENT ORDERED:
Code stroke initiated. Pt brought down to CT immediately. Safety maintained. Code stroke immediately. Pt brought down to CT immediately. Safety maintained.

## 2022-09-24 LAB
ALBUMIN SERPL ELPH-MCNC: 3.4 G/DL — SIGNIFICANT CHANGE UP (ref 3.3–5)
ALP SERPL-CCNC: 29 U/L — LOW (ref 40–120)
ALT FLD-CCNC: <6 U/L — LOW (ref 10–45)
ANION GAP SERPL CALC-SCNC: 10 MMOL/L — SIGNIFICANT CHANGE UP (ref 5–17)
AST SERPL-CCNC: 24 U/L — SIGNIFICANT CHANGE UP (ref 10–40)
BASOPHILS # BLD AUTO: 0.03 K/UL — SIGNIFICANT CHANGE UP (ref 0–0.2)
BASOPHILS NFR BLD AUTO: 0.3 % — SIGNIFICANT CHANGE UP (ref 0–2)
BILIRUB SERPL-MCNC: 0.8 MG/DL — SIGNIFICANT CHANGE UP (ref 0.2–1.2)
BUN SERPL-MCNC: 20 MG/DL — SIGNIFICANT CHANGE UP (ref 7–23)
CALCIUM SERPL-MCNC: 9 MG/DL — SIGNIFICANT CHANGE UP (ref 8.4–10.5)
CHLORIDE SERPL-SCNC: 100 MMOL/L — SIGNIFICANT CHANGE UP (ref 96–108)
CHOLEST SERPL-MCNC: 241 MG/DL — HIGH
CO2 SERPL-SCNC: 29 MMOL/L — SIGNIFICANT CHANGE UP (ref 22–31)
CREAT SERPL-MCNC: 1.06 MG/DL — SIGNIFICANT CHANGE UP (ref 0.5–1.3)
EGFR: 49 ML/MIN/1.73M2 — LOW
EOSINOPHIL # BLD AUTO: 0.02 K/UL — SIGNIFICANT CHANGE UP (ref 0–0.5)
EOSINOPHIL NFR BLD AUTO: 0.2 % — SIGNIFICANT CHANGE UP (ref 0–6)
GLUCOSE BLDC GLUCOMTR-MCNC: 88 MG/DL — SIGNIFICANT CHANGE UP (ref 70–99)
GLUCOSE SERPL-MCNC: 117 MG/DL — HIGH (ref 70–99)
HCT VFR BLD CALC: 39.3 % — SIGNIFICANT CHANGE UP (ref 34.5–45)
HDLC SERPL-MCNC: 81 MG/DL — SIGNIFICANT CHANGE UP
HGB BLD-MCNC: 12.9 G/DL — SIGNIFICANT CHANGE UP (ref 11.5–15.5)
IMM GRANULOCYTES NFR BLD AUTO: 0.5 % — SIGNIFICANT CHANGE UP (ref 0–0.9)
LIPID PNL WITH DIRECT LDL SERPL: 145 MG/DL — HIGH
LYMPHOCYTES # BLD AUTO: 1.31 K/UL — SIGNIFICANT CHANGE UP (ref 1–3.3)
LYMPHOCYTES # BLD AUTO: 13.5 % — SIGNIFICANT CHANGE UP (ref 13–44)
MCHC RBC-ENTMCNC: 31.3 PG — SIGNIFICANT CHANGE UP (ref 27–34)
MCHC RBC-ENTMCNC: 32.8 GM/DL — SIGNIFICANT CHANGE UP (ref 32–36)
MCV RBC AUTO: 95.4 FL — SIGNIFICANT CHANGE UP (ref 80–100)
MONOCYTES # BLD AUTO: 0.82 K/UL — SIGNIFICANT CHANGE UP (ref 0–0.9)
MONOCYTES NFR BLD AUTO: 8.4 % — SIGNIFICANT CHANGE UP (ref 2–14)
NEUTROPHILS # BLD AUTO: 7.48 K/UL — HIGH (ref 1.8–7.4)
NEUTROPHILS NFR BLD AUTO: 77.1 % — HIGH (ref 43–77)
NON HDL CHOLESTEROL: 159 MG/DL — HIGH
NRBC # BLD: 0 /100 WBCS — SIGNIFICANT CHANGE UP (ref 0–0)
PLATELET # BLD AUTO: 236 K/UL — SIGNIFICANT CHANGE UP (ref 150–400)
POTASSIUM SERPL-MCNC: 3.2 MMOL/L — LOW (ref 3.5–5.3)
POTASSIUM SERPL-SCNC: 3.2 MMOL/L — LOW (ref 3.5–5.3)
PROT SERPL-MCNC: 7.3 G/DL — SIGNIFICANT CHANGE UP (ref 6–8.3)
RBC # BLD: 4.12 M/UL — SIGNIFICANT CHANGE UP (ref 3.8–5.2)
RBC # FLD: 13.2 % — SIGNIFICANT CHANGE UP (ref 10.3–14.5)
SODIUM SERPL-SCNC: 139 MMOL/L — SIGNIFICANT CHANGE UP (ref 135–145)
T4 FREE SERPL-MCNC: 1.2 NG/DL — SIGNIFICANT CHANGE UP (ref 0.9–1.8)
TRIGL SERPL-MCNC: 74 MG/DL — SIGNIFICANT CHANGE UP
WBC # BLD: 9.71 K/UL — SIGNIFICANT CHANGE UP (ref 3.8–10.5)
WBC # FLD AUTO: 9.71 K/UL — SIGNIFICANT CHANGE UP (ref 3.8–10.5)

## 2022-09-24 PROCEDURE — 99233 SBSQ HOSP IP/OBS HIGH 50: CPT

## 2022-09-24 PROCEDURE — 99233 SBSQ HOSP IP/OBS HIGH 50: CPT | Mod: FS

## 2022-09-24 PROCEDURE — 70450 CT HEAD/BRAIN W/O DYE: CPT | Mod: 26

## 2022-09-24 RX ORDER — POTASSIUM CHLORIDE 20 MEQ
10 PACKET (EA) ORAL
Refills: 0 | Status: DISCONTINUED | OUTPATIENT
Start: 2022-09-24 | End: 2022-09-24

## 2022-09-24 RX ORDER — OLANZAPINE 15 MG/1
1.25 TABLET, FILM COATED ORAL ONCE
Refills: 0 | Status: COMPLETED | OUTPATIENT
Start: 2022-09-24 | End: 2022-09-24

## 2022-09-24 RX ORDER — POTASSIUM CHLORIDE 20 MEQ
10 PACKET (EA) ORAL
Refills: 0 | Status: COMPLETED | OUTPATIENT
Start: 2022-09-24 | End: 2022-09-24

## 2022-09-24 RX ORDER — OLANZAPINE 15 MG/1
1.25 TABLET, FILM COATED ORAL EVERY 6 HOURS
Refills: 0 | Status: DISCONTINUED | OUTPATIENT
Start: 2022-09-24 | End: 2022-09-26

## 2022-09-24 RX ADMIN — Medication 100 MILLIEQUIVALENT(S): at 10:03

## 2022-09-24 RX ADMIN — OLANZAPINE 1.25 MILLIGRAM(S): 15 TABLET, FILM COATED ORAL at 10:50

## 2022-09-24 RX ADMIN — CEFTRIAXONE 100 MILLIGRAM(S): 500 INJECTION, POWDER, FOR SOLUTION INTRAMUSCULAR; INTRAVENOUS at 05:53

## 2022-09-24 RX ADMIN — ATORVASTATIN CALCIUM 40 MILLIGRAM(S): 80 TABLET, FILM COATED ORAL at 22:35

## 2022-09-24 RX ADMIN — Medication 100 MILLIEQUIVALENT(S): at 08:39

## 2022-09-24 RX ADMIN — ENOXAPARIN SODIUM 40 MILLIGRAM(S): 100 INJECTION SUBCUTANEOUS at 14:18

## 2022-09-24 RX ADMIN — SENNA PLUS 2 TABLET(S): 8.6 TABLET ORAL at 22:35

## 2022-09-24 RX ADMIN — OLANZAPINE 1.25 MILLIGRAM(S): 15 TABLET, FILM COATED ORAL at 08:20

## 2022-09-24 NOTE — PROGRESS NOTE ADULT - SUBJECTIVE AND OBJECTIVE BOX
Neurology Progress Note    Subjective & Objective:  This Am lethargic s/p sedation with Zyprexa 2.5 mg for agitation and confusion.  S/P code stroke yesterday for transient episode of confusion and questionable aphasia.  Repeat CT head no acute process.      EEG Report:  · EEG Report      DAPHNE LIMON [id number] (20-May-1931)F  Admitting MD: Dr. Tram Lockhart    Study Date: 09-23-22    --------------------------------------------------------------------------------------------------  History:  CC/ HPI Patient is a 89y old  Female who presents with a chief complaint of altered MS, lethargy (23 Sep 2022 08:04)    aspirin  chewable 81 milliGRAM(s) Oral daily  cefTRIAXone   IVPB 1000 milliGRAM(s) IV Intermittent every 24 hours  enoxaparin Injectable 40 milliGRAM(s) SubCutaneous every 24 hours  influenza  Vaccine (HIGH DOSE) 0.7 milliLiter(s) IntraMuscular once  levothyroxine 75 MICROGram(s) Oral daily  metoprolol tartrate 12.5 milliGRAM(s) Oral two times a day  oxybutynin 5 milliGRAM(s) Oral two times a day  senna 2 Tablet(s) Oral at bedtime  sodium chloride 0.9%. 1000 milliLiter(s) IV Continuous <Continuous>    --------------------------------------------------------------------------------------------------  Study Interpretation:    [[[Abbreviation Key:  PDR=alpha rhythm/posterior dominant rhythm. A-P=anterior posterior gradient.  Amplitude: ‘very low’:<20; ‘low’:20-50; ‘medium’:; ‘high’:>200uV.  Persistence for periodic/rhythmic patterns (% of epoch) ‘rare’:<1%; ‘occasional’:1-10%; ‘frequent’:10-50%; ‘abundant’:50-90%; ‘continuous’:>90%.  Persistence for sporadic discharges: ‘rare’:<1/hr; ‘occasional’:1/min-1/hr; ‘frequent’:>1/min; ‘abundant’:>1/10 sec.  GRDA=generalized rhythmic delta activity; FIRDA=frontal intermittent GRDA; LRDA=lateralized rhythmic delta activity; TIRDA=temporal intermittent rhythmic delta activity;  LPD=PLED=lateralized periodic discharges; GPD=generalized periodic discharges; BiPDs=BiPLEDs=bilateral independent periodic epileptiform discharges; SIRPID=stimulus induced rhythmic, periodic, or ictal appearing discharges; BIRDs=brief potentially ictal rhythmic discharges >4 Hz, lasting .5-10s; PFA (paroxysmal bursts >13 Hz or =8 Hz).  Modifiers: +F=with fast component; +S=with spike component; +R=with rhythmic component.  S-B=burst suppression pattern.  Max=maximal. N1-drowsy; N2-stage II sleep; N3-slow wave sleep. SSS/BETS=small sharp spikes/benign epileptiform transients of sleep. HV=hyperventilation; PS=photic stimulation]]]    FINDINGS:  The background was continuous, spontaneously variable and reactive.  During wakefulness, there is no discernable PDR.     Background Slowing:  Generalized slowing: diffuse polymorphic theta/delta slowing.   Focal slowing: none was present.     Sleep Background:  Drowsiness was characterized by fragmentation, attenuation, and slowing of the background activity.        Epileptiform Activity:   No interictal epileptiform discharges were present.    Events:  No clinical events were recorded.  No seizures were recorded.    Activation Procedures:   Hyperventilation was not performed.    Photic stimulation was performed and did not elicit any abnormalities.      Artifacts:  Intermittent myogenic and external motion artifacts were noted.    ECG:  The heart rate on single channel ECG at baseline was predominantly near BPM =60-70   -----------------------------------------------------------------------------------------------------    EEG Classification / Summary:  Abormal EEG study, awake / drowsy     - Background slowing, generalized,  mild     -----------------------------------------------------------------------------------------------------    Clinical Impression:    - Mild diffuse/multifocal cerebral dysfunction.   - There were no epileptiform abnormalities recorded.      -------------------------------------------------------------------------------------------------------  [facility] EEG Reading Room Ph#: [phone]  Epilepsy Answering Service after 5PM and before 8:30AM: Ph#: [phone]    Felipe Rubio MD   Epilepsy Fellow, [facility]	    Mendoza Wilson MD PhD  Director, Epilepsy Division, [facility]       Electronic Signatures:  Don Martinez)  (Signature Pending)      < from: CT Brain Stroke Protocol (09.23.22 @ 14:03) >    ACC: [id number] EXAM:  CT BRAIN STROKE PROTOCOL                          PROCEDURE DATE:  09/23/2022          INTERPRETATION:  CT HEAD STROKE PROTOCOL    CLINICAL HISTORY: Code Stroke. Altered mental status    TECHNIQUE:  Noncontrast CT.  Axial Acquisition.  Sagittal and coronal reformations.    COMPARISON:  Exam is compared to prior study of 9/21/2022    FINDINGS:  Exam is degraded by motion.  *  HEMORRHAGE:  No evidence of intracranial hemorrhage.  *  BRAIN PARENCHYMA:  Mild to moderate atrophy. Chronic small right   parietal infarct. Chronic lacunar infarct left thalamus. Mild to moderate   patchy periventricular and subcortical white matter ischemic changes.  No   mass or mass effect.  *  VENTRICLES / SHIFT:  No hydrocephalus. No midline shift.  *  EXTRA-AXIAL / BASAL CISTERNS:  No extra-axial mass. Basal cisterns   preserved.  Atherosclerotic calcifications of the cavernous internal   carotid arteries.  *  CALVARIUM AND EXTRACRANIAL SOFT TISSUES:  No depressed calvarial   fracture.  *  SINUSES, ORBITS, MASTOIDS:  The visualized paranasal sinuses and   mastoid air cells are well aerated.    IMPRESSION:  DEGRADED BY MOTION. NO EVIDENCE OF AN ACUTE INTRACRANIAL HEMORRHAGE,   MIDLINE SHIFT, OR HYDROCEPHALUS. CHRONIC SMALL RIGHT PARIETAL INFARCT.   CHRONIC LACUNAR INFARCT LEFT THALAMUS. PATCHY WHITE MATTER ISCHEMIC   CHANGES. NO SIGNIFICANT INTERVAL CHANGE FROM PRIOR    Findings discussed with ANJEL Layne at 2:17 PM on 9/23/2022    --- End of Report ---            PRISCILA GANDARA MD; Attending Radiologist  This document has been electronically signed. Sep 23 2022  2:17PM    < end of copied text >                                       MEDICATIONS  (STANDING):  aspirin  chewable 81 milliGRAM(s) Oral daily  atorvastatin 40 milliGRAM(s) Oral at bedtime  dextrose 5% + sodium chloride 0.9%. 1000 milliLiter(s) (50 mL/Hr) IV Continuous <Continuous>  enoxaparin Injectable 40 milliGRAM(s) SubCutaneous every 24 hours  influenza  Vaccine (HIGH DOSE) 0.7 milliLiter(s) IntraMuscular once  levothyroxine 88 MICROGram(s) Oral daily  metoprolol tartrate 12.5 milliGRAM(s) Oral two times a day  oxybutynin 5 milliGRAM(s) Oral two times a day  senna 2 Tablet(s) Oral at bedtime    MEDICATIONS  (PRN):  acetaminophen     Tablet .. 650 milliGRAM(s) Oral every 6 hours PRN Temp greater or equal to 38C (100.4F), Mild Pain (1 - 3)  bisacodyl Suppository 10 milliGRAM(s) Rectal daily PRN Constipation

## 2022-09-24 NOTE — BH CONSULTATION LIAISON ASSESSMENT NOTE - HPI (INCLUDE ILLNESS QUALITY, SEVERITY, DURATION, TIMING, CONTEXT, MODIFYING FACTORS, ASSOCIATED SIGNS AND SYMPTOMS)
89 y/o F w/ HTN, HLD, Hypothyroidism, Dementia, BIBEMS, from NH because of altered MS, lethargy, not answering questions (pt usually verbal).  Pt was last known well at 7:30 AM.  FS was 82.  CT head, CTA head/neck w/ no acute stroke nor occlusion. While in the ED, pt noted to have FS of 53, was given an amp of D50.  Pt noted to be more responsive.   NIHSS score is 9.     89 y/o F w/ HTN, HLD, Hypothyroidism, Dementia, BIBEMS, from NH because of altered MS, lethargy, not answering questions (pt usually verbal).  Pt was last known well at 7:30 AM.  FS was 82.  CT head, CTA head/neck w/ no acute stroke nor occlusion. While in the ED, pt noted to have FS of 53, was given an amp of D50.  Pt noted to be more responsive.   psychiatry asked to evaluate for agitation and AMS. patient is seen after having 2 im ingections of zyprexa 1.25 mg . . Doing much better, calm , not agitated , able to talk and give some information about herself , knows where she is , her , her family members .

## 2022-09-24 NOTE — PROGRESS NOTE ADULT - SUBJECTIVE AND OBJECTIVE BOX
Patient is a 89y old  Female who presents with a chief complaint of altered MS, lethargy (23 Sep 2022 15:16)    Patient seen and examined at bedside.  no acute events overnight    ALLERGIES:  No Known Allergies        Vital Signs Last 24 Hrs  T(F): 98.1 (24 Sep 2022 05:12), Max: 98.1 (24 Sep 2022 05:12)  HR: 94 (24 Sep 2022 05:12) (79 - 116)  BP: 157/77 (24 Sep 2022 05:12) (131/99 - 177/79)  RR: 18 (24 Sep 2022 05:12) (16 - 19)  SpO2: 100% (24 Sep 2022 05:12) (97% - 100%)  I&O's Summary    23 Sep 2022 07:01  -  24 Sep 2022 07:00  --------------------------------------------------------  IN: 0 mL / OUT: 200 mL / NET: -200 mL      MEDICATIONS:  acetaminophen     Tablet .. 650 milliGRAM(s) Oral every 6 hours PRN  aspirin  chewable 81 milliGRAM(s) Oral daily  atorvastatin 40 milliGRAM(s) Oral at bedtime  bisacodyl Suppository 10 milliGRAM(s) Rectal daily PRN  dextrose 5% + sodium chloride 0.9%. 1000 milliLiter(s) IV Continuous <Continuous>  enoxaparin Injectable 40 milliGRAM(s) SubCutaneous every 24 hours  influenza  Vaccine (HIGH DOSE) 0.7 milliLiter(s) IntraMuscular once  levothyroxine 88 MICROGram(s) Oral daily  metoprolol tartrate 12.5 milliGRAM(s) Oral two times a day  oxybutynin 5 milliGRAM(s) Oral two times a day  senna 2 Tablet(s) Oral at bedtime      PHYSICAL EXAM:  General: NAD, A/O x 2  ENT: MMM, no oral thrush  Neck: Supple, No JVD  Lungs: Clear to auscultation bilaterally, non labored  Cardio: RRR, S1/S2, No murmurs  Abdomen: Soft, Nontender, Nondistended; Bowel sounds present  Extremities: No cyanosis, No edema    LABS:                        12.9   9.71  )-----------( 236      ( 24 Sep 2022 06:50 )             39.3         139  |  100  |  20  ----------------------------<  117  3.2   |  29  |  1.06    Ca    9.0      24 Sep 2022 06:50  Phos  3.0       Mg     2.0         TPro  7.3  /  Alb  3.4  /  TBili  0.8  /  DBili  x   /  AST  24  /  ALT  <6  /  AlkPhos  29        PT/INR - ( 23 Sep 2022 14:40 )   PT: 11.5 sec;   INR: 0.99 ratio         PTT - ( 23 Sep 2022 14:40 )  PTT:26.4 sec  Lactate, Blood: 0.7 mmol/L ( @ 05:30)    CARDIAC MARKERS ( 23 Sep 2022 14:40 )  x     / 21.6 ng/L / 119 U/L / x     / 2.0 ng/mL  CARDIAC MARKERS ( 21 Sep 2022 15:35 )  x     / 7.9 ng/L / x     / x     / x            TSH --   TSH with FT4 reflex --  Total T3 44          96 (23 Sep 2022 14:57)      POCT Blood Glucose.: 96 mg/dL (23 Sep 2022 13:39)      Urinalysis Basic - ( 21 Sep 2022 19:15 )    Color: Yellow / Appearance: Clear / S.010 / pH: x  Gluc: x / Ketone: Moderate  / Bili: Negative / Urobili: Negative   Blood: x / Protein: 100 / Nitrite: Positive   Leuk Esterase: Small / RBC: 11-25 /HPF / WBC 6-10 /HPF   Sq Epi: x / Non Sq Epi: Neg.-Few / Bacteria: Moderate /HPF        Culture - Urine (collected 21 Sep 2022 19:15)  Source: Catheterized Catheterized  Final Report (23 Sep 2022 10:49):    No growth      COVID-19 PCR: NotDetec (22 @ 15:35)      RADIOLOGY & ADDITIONAL TESTS:    Care Discussed with Consultants/Other Providers:

## 2022-09-24 NOTE — BH CONSULTATION LIAISON ASSESSMENT NOTE - NSBHCHARTREVIEWVS_PSY_A_CORE FT
Vital Signs Last 24 Hrs  T(C): 36.4 (24 Sep 2022 12:52), Max: 36.7 (24 Sep 2022 05:12)  T(F): 97.5 (24 Sep 2022 12:52), Max: 98.1 (24 Sep 2022 05:12)  HR: 61 (24 Sep 2022 12:52) (61 - 94)  BP: 174/78 (24 Sep 2022 12:52) (157/77 - 174/78)  BP(mean): --  RR: 15 (24 Sep 2022 12:52) (15 - 19)  SpO2: 96% (24 Sep 2022 12:52) (96% - 100%)    Parameters below as of 24 Sep 2022 12:52  Patient On (Oxygen Delivery Method): room air

## 2022-09-24 NOTE — PROGRESS NOTE ADULT - ASSESSMENT
Impression:  89 yo female with dementia admitted with acute confusion but non focal.  CT head reveals chronic microvascular changes and CT angiogram and perfusion unremarkable. Hypoglycemic in ED. Neuro exam was non focal and suggestive of dementia and a metabolic encephalopathy. Yesterday episode of transient confusion and questionable aphasia. CT head no acute changes.  Stroke code called and no TPA since unclear this was a focal event and symptoms resolved. Last PM increased confusion and given Zyprexa. Neuro exam is now non focal with patient sedated and confused. EEG is consistent with diffuse dysfunction but non focal and no seizures seen. R/O CVA. R/O metabolic encephalopathy. Dementia.    REC:  Repeat head CT and I would suggest brain MRI non contrast in this setting if able.

## 2022-09-24 NOTE — PROGRESS NOTE ADULT - ASSESSMENT
89F with HTN, hypothyroidism, dementia, came from SNF due to AMS, found to have hypoglycemia, course further c/b code stoke rule out CVA 9/23, now back to baseline.    # Probable Metabolic Encephalopathy   # Dementia with Agitation  neurology following  s/p code stroke 9/23 for acute AMS while undergoing S+S exam  pt much improved probably at or near baseline, Neuro exam is non focal  CTH showed chronic microvascular changes  CT Angio and perfusion unremarkable  EEG revealed mild diffuse/multifocal cerebral dysfunction w/o epileptiform activity  defer further neuro workup for now, follow up lipid profile  plan to obtain psych evaluation for inc agitation  pt received zyprexa 1.25 mg IM x 2 doses today for agitation 9/24    # Hypoglycemia  pt found to be hypoglycemic in ED  may be from dehydration and poor po intake  dysphagia screen not completed due to code stroke 9/23  keep NPO for now, maintenance fluids  A1c 5.1  monitor accuchecks, blood sugar stable for now  consider bedside swallow evaluation by nurse later today, or S+S follow up    # Hypothyroid  TSH elevated  increased synthroid 88 mcg daily  appreciate endo consult and recs    # Acute UTI  continue rocephin, today is day 3, consider dc after today  urine culture negative    # Essential HTN  continue lopressor    # DVT ppx  lovenox    updated hcp xochitl today [phone]                 89F with HTN, hypothyroidism, dementia, came from SNF due to AMS, found to have hypoglycemia, course further c/b code stoke rule out CVA 9/23, now back to baseline.    # Probable Metabolic Encephalopathy   # Dementia with Agitation  neurology following  s/p code stroke 9/23 for acute AMS while undergoing S+S exam  pt much improved probably at or near baseline, Neuro exam is non focal  CTH showed chronic microvascular changes  CT Angio and perfusion unremarkable  EEG revealed mild diffuse/multifocal cerebral dysfunction w/o epileptiform activity  defer further neuro workup for now, follow up lipid profile  plan to obtain repeat non cont CTH later today as per neuro  plan to obtain psych evaluation for inc agitation  pt received zyprexa 1.25 mg IM x 2 doses today for agitation 9/24    # Hypoglycemia  pt found to be hypoglycemic in ED  may be from dehydration and poor po intake  dysphagia screen not completed due to code stroke 9/23  keep NPO for now, maintenance fluids  A1c 5.1  monitor accuchecks, blood sugar stable for now  consider bedside swallow evaluation by nurse later today, or S+S follow up    # Hypothyroid  TSH elevated  increased synthroid 88 mcg daily  appreciate endo consult and recs    # Acute UTI  continue rocephin, today is day 3, consider dc after today  urine culture negative    # Essential HTN  continue lopressor    # DVT ppx  lovenox    updated hcp xochitl today [phone]                 89F with HTN, hypothyroidism, dementia, came from SNF due to AMS, found to have hypoglycemia, course further c/b code stoke rule out CVA 9/23, now back to baseline.    # Probable Metabolic Encephalopathy   # Dementia with Agitation  neurology following  s/p code stroke 9/23 for acute AMS while undergoing S+S exam  pt much improved probably at or near baseline, Neuro exam is non focal  CTH showed chronic microvascular changes  CT Angio and perfusion unremarkable  EEG revealed mild diffuse/multifocal cerebral dysfunction w/o epileptiform activity  defer further neuro workup for now, follow up lipid profile  plan to obtain repeat non cont CTH later today as per neuro  plan to obtain psych evaluation for inc agitation  pt received zyprexa 1.25 mg IM x 2 doses today for agitation 9/24, apprec psych fr recs    # Hypoglycemia  pt found to be hypoglycemic in ED  may be from dehydration and poor po intake  dysphagia screen not completed due to code stroke 9/23  keep NPO for now, maintenance fluids  A1c 5.1  monitor accuchecks, blood sugar stable for now  consider bedside swallow evaluation by nurse later today, or S+S follow up    # Hypothyroid  TSH elevated  increased synthroid 88 mcg daily  appreciate endo consult and recs    # Acute UTI  continue rocephin, today is day 3, consider dc after today  urine culture negative    # Essential HTN  continue lopressor    # DVT ppx  lovenox    updated hcp xochitl today [phone]

## 2022-09-24 NOTE — BH CONSULTATION LIAISON ASSESSMENT NOTE - SUMMARY
89 y/o F w/ HTN, HLD, Hypothyroidism, Dementia, BIBEMS, from NH because of altered MS, lethargy, not answering questions (pt usually verbal).    Psychiatry asked to evaluate for agitation and AMS. patient is seen after having 2 im injections of zyprexa 1.25 mg .She looks much younger stated age . Doing much better, calm , not agitated , able to talk and give some information about herself , knows where she is , her , her family members .

## 2022-09-24 NOTE — BH CONSULTATION LIAISON ASSESSMENT NOTE - CURRENT MEDICATION
MEDICATIONS  (STANDING):  aspirin  chewable 81 milliGRAM(s) Oral daily  atorvastatin 40 milliGRAM(s) Oral at bedtime  dextrose 5% + sodium chloride 0.9%. 1000 milliLiter(s) (50 mL/Hr) IV Continuous <Continuous>  enoxaparin Injectable 40 milliGRAM(s) SubCutaneous every 24 hours  influenza  Vaccine (HIGH DOSE) 0.7 milliLiter(s) IntraMuscular once  levothyroxine 88 MICROGram(s) Oral daily  metoprolol tartrate 12.5 milliGRAM(s) Oral two times a day  oxybutynin 5 milliGRAM(s) Oral two times a day  senna 2 Tablet(s) Oral at bedtime    MEDICATIONS  (PRN):  acetaminophen     Tablet .. 650 milliGRAM(s) Oral every 6 hours PRN Temp greater or equal to 38C (100.4F), Mild Pain (1 - 3)  bisacodyl Suppository 10 milliGRAM(s) Rectal daily PRN Constipation

## 2022-09-25 LAB
ANION GAP SERPL CALC-SCNC: 10 MMOL/L — SIGNIFICANT CHANGE UP (ref 5–17)
BUN SERPL-MCNC: 19 MG/DL — SIGNIFICANT CHANGE UP (ref 7–23)
CALCIUM SERPL-MCNC: 8.7 MG/DL — SIGNIFICANT CHANGE UP (ref 8.4–10.5)
CHLORIDE SERPL-SCNC: 103 MMOL/L — SIGNIFICANT CHANGE UP (ref 96–108)
CO2 SERPL-SCNC: 24 MMOL/L — SIGNIFICANT CHANGE UP (ref 22–31)
CREAT SERPL-MCNC: 0.76 MG/DL — SIGNIFICANT CHANGE UP (ref 0.5–1.3)
EGFR: 75 ML/MIN/1.73M2 — SIGNIFICANT CHANGE UP
GLUCOSE SERPL-MCNC: 103 MG/DL — HIGH (ref 70–99)
HCT VFR BLD CALC: 37.5 % — SIGNIFICANT CHANGE UP (ref 34.5–45)
HGB BLD-MCNC: 12.4 G/DL — SIGNIFICANT CHANGE UP (ref 11.5–15.5)
MCHC RBC-ENTMCNC: 31.7 PG — SIGNIFICANT CHANGE UP (ref 27–34)
MCHC RBC-ENTMCNC: 33.1 GM/DL — SIGNIFICANT CHANGE UP (ref 32–36)
MCV RBC AUTO: 95.9 FL — SIGNIFICANT CHANGE UP (ref 80–100)
NRBC # BLD: 0 /100 WBCS — SIGNIFICANT CHANGE UP (ref 0–0)
PLATELET # BLD AUTO: 213 K/UL — SIGNIFICANT CHANGE UP (ref 150–400)
POTASSIUM SERPL-MCNC: 3.7 MMOL/L — SIGNIFICANT CHANGE UP (ref 3.5–5.3)
POTASSIUM SERPL-SCNC: 3.7 MMOL/L — SIGNIFICANT CHANGE UP (ref 3.5–5.3)
RBC # BLD: 3.91 M/UL — SIGNIFICANT CHANGE UP (ref 3.8–5.2)
RBC # FLD: 13.2 % — SIGNIFICANT CHANGE UP (ref 10.3–14.5)
SODIUM SERPL-SCNC: 137 MMOL/L — SIGNIFICANT CHANGE UP (ref 135–145)
WBC # BLD: 6.86 K/UL — SIGNIFICANT CHANGE UP (ref 3.8–10.5)
WBC # FLD AUTO: 6.86 K/UL — SIGNIFICANT CHANGE UP (ref 3.8–10.5)

## 2022-09-25 PROCEDURE — 99233 SBSQ HOSP IP/OBS HIGH 50: CPT

## 2022-09-25 PROCEDURE — 93010 ELECTROCARDIOGRAM REPORT: CPT

## 2022-09-25 PROCEDURE — 99233 SBSQ HOSP IP/OBS HIGH 50: CPT | Mod: FS

## 2022-09-25 RX ADMIN — Medication 81 MILLIGRAM(S): at 12:49

## 2022-09-25 RX ADMIN — Medication 10 MILLIGRAM(S): at 10:31

## 2022-09-25 RX ADMIN — OLANZAPINE 1.25 MILLIGRAM(S): 15 TABLET, FILM COATED ORAL at 06:52

## 2022-09-25 RX ADMIN — Medication 5 MILLIGRAM(S): at 17:26

## 2022-09-25 RX ADMIN — Medication 12.5 MILLIGRAM(S): at 05:44

## 2022-09-25 RX ADMIN — ENOXAPARIN SODIUM 40 MILLIGRAM(S): 100 INJECTION SUBCUTANEOUS at 15:02

## 2022-09-25 RX ADMIN — ATORVASTATIN CALCIUM 40 MILLIGRAM(S): 80 TABLET, FILM COATED ORAL at 22:49

## 2022-09-25 RX ADMIN — SENNA PLUS 2 TABLET(S): 8.6 TABLET ORAL at 22:49

## 2022-09-25 RX ADMIN — Medication 12.5 MILLIGRAM(S): at 17:31

## 2022-09-25 RX ADMIN — Medication 88 MICROGRAM(S): at 05:44

## 2022-09-25 RX ADMIN — Medication 5 MILLIGRAM(S): at 05:45

## 2022-09-25 NOTE — PROGRESS NOTE ADULT - TIME BILLING
care coordination, plan of care discussed with patient face to face, 2E IDR team
care coordination, plan of care discussed with patient's HCP, RN, 2E IDR team
care coordinaiton, plan of care discussed with family as above, tele IDR team, PARADISE Rivera

## 2022-09-25 NOTE — PROGRESS NOTE ADULT - SUBJECTIVE AND OBJECTIVE BOX
Patient is a 89y old  Female who presents with a chief complaint of altered MS, lethargy (24 Sep 2022 12:53)    Patient seen and examined at bedside.  no acute events overnight    ALLERGIES:  No Known Allergies        Vital Signs Last 24 Hrs  T(F): 97.9 (25 Sep 2022 05:35), Max: 98.5 (24 Sep 2022 21:04)  HR: 77 (25 Sep 2022 05:35) (61 - 77)  BP: 152/65 (25 Sep 2022 05:35) (152/65 - 174/78)  RR: 16 (25 Sep 2022 05:35) (15 - 17)  SpO2: 98% (25 Sep 2022 05:35) (96% - 98%)  I&O's Summary    MEDICATIONS:  acetaminophen     Tablet .. 650 milliGRAM(s) Oral every 6 hours PRN  aspirin  chewable 81 milliGRAM(s) Oral daily  atorvastatin 40 milliGRAM(s) Oral at bedtime  bisacodyl Suppository 10 milliGRAM(s) Rectal daily PRN  dextrose 5% + sodium chloride 0.9%. 1000 milliLiter(s) IV Continuous <Continuous>  enoxaparin Injectable 40 milliGRAM(s) SubCutaneous every 24 hours  influenza  Vaccine (HIGH DOSE) 0.7 milliLiter(s) IntraMuscular once  levothyroxine 88 MICROGram(s) Oral daily  metoprolol tartrate 12.5 milliGRAM(s) Oral two times a day  OLANZapine Injectable 1.25 milliGRAM(s) IntraMuscular every 6 hours PRN  oxybutynin 5 milliGRAM(s) Oral two times a day  senna 2 Tablet(s) Oral at bedtime      PHYSICAL EXAM:  General: NAD, Alert but confused  ENT: MMM, no oral thrush  Neck: Supple, No JVD  Lungs: Clear to auscultation bilaterally, non labored breathing  Cardio: S1S2 regular  Abdomen: Soft, Nontender, Nondistended; Bowel sounds present  Extremities: No cyanosis, No edema  Neuro: Alert, Non focal Neuro Exam, unable to follow commands    LABS:                        12.4   6.86  )-----------( 213      ( 25 Sep 2022 05:00 )             37.5     09-25    137  |  103  |  19  ----------------------------<  103  3.7   |  24  |  0.76    Ca    8.7      25 Sep 2022 05:00  Phos  3.0     09-23  Mg     2.0     09-23    TPro  7.3  /  Alb  3.4  /  TBili  0.8  /  DBili  x   /  AST  24  /  ALT  <6  /  AlkPhos  29  09-24      PT/INR - ( 23 Sep 2022 14:40 )   PT: 11.5 sec;   INR: 0.99 ratio         PTT - ( 23 Sep 2022 14:40 )  PTT:26.4 sec  Lactate, Blood: 0.7 mmol/L (09-23 @ 05:30)    CARDIAC MARKERS ( 23 Sep 2022 14:40 )  x     / 21.6 ng/L / 119 U/L / x     / 2.0 ng/mL      09-24 Chol 241 mg/dL LDL -- HDL 81 mg/dL Trig 74 mg/dL  TSH --   TSH with FT4 reflex --  Total T3 44              POCT Blood Glucose.: 88 mg/dL (24 Sep 2022 22:43)          Culture - Urine (collected 21 Sep 2022 19:15)  Source: Catheterized Catheterized  Final Report (23 Sep 2022 10:49):    No growth      COVID-19 PCR: NotDetec (09-21-22 @ 15:35)      RADIOLOGY & ADDITIONAL TESTS:    Care Discussed with Consultants/Other Providers:

## 2022-09-25 NOTE — PROGRESS NOTE ADULT - SUBJECTIVE AND OBJECTIVE BOX
Neurology Progress Note    Subjective & Objective: Patient having periods of confusion and agitation receiving Zyprexa PRN.  No evolved focality and no seizures.  Repeat CT head no acute changes.  EEG diffusely abnormal but no focality or seizures. Psych note seen.      < from: CT Head No Cont (09.24.22 @ 17:23) >    ACC: [id number] EXAM:  CT BRAIN                          PROCEDURE DATE:  09/24/2022          INTERPRETATION:  CLINICAL INDICATION:  Encephalopathy    TECHNIQUE: Noncontrast CT examination of the head was performed using   contiguous 5 mm CT images.    COMPARISON: 9/23/2022    FINDINGS:    There is no evidence of mass or acute intracranial hemorrhage. Ventricles   and sulci are normal in size and configuration for the patient's stated   age. No midline shift or other significant mass effect is noted. There is   no CT evidence of acute territorial infarct. 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 There are periventricular white   matter hypodensities that are nonspecific in nature but may reflect   chronic ischemic microvascular disease.    The visualized paranasal sinuses and tympanomastoid spaces are clear.   Orbits and orbital contents are unremarkable.    There is no depressed calvarial fracture.        IMPRESSION:    No evidence of acute intracranial hemorrhage, midline shift or CT   evidence of acute territorial infarct. No significant change from the   prior study dated 9/23/2022.    If the patient's symptoms persist, consider short interval follow-up head   CT or brain MRI if there are no MRI contraindications.    --- End of Report ---      < end of copied text >                Lab:  09-25    137  |  103  |  19  ----------------------------<  103<H>  3.7   |  24  |  0.76    Ca    8.7      25 Sep 2022 05:00    TPro  7.3  /  Alb  3.4  /  TBili  0.8  /  DBili  x   /  AST  24  /  ALT  <6<L>  /  AlkPhos  29<L>  09-24    LIVER FUNCTIONS - ( 24 Sep 2022 06:50 )  Alb: 3.4 g/dL / Pro: 7.3 g/dL / ALK PHOS: 29 U/L / ALT: <6 U/L / AST: 24 U/L / GGT: x           total cholesterol 241 mg/dL  HDL 81 mg/dL  LDL --                          12.4   6.86  )-----------( 213      ( 25 Sep 2022 05:00 )             37.5           MEDICATIONS  (STANDING):  aspirin  chewable 81 milliGRAM(s) Oral daily  atorvastatin 40 milliGRAM(s) Oral at bedtime  enoxaparin Injectable 40 milliGRAM(s) SubCutaneous every 24 hours  influenza  Vaccine (HIGH DOSE) 0.7 milliLiter(s) IntraMuscular once  levothyroxine 88 MICROGram(s) Oral daily  metoprolol tartrate 12.5 milliGRAM(s) Oral two times a day  oxybutynin 5 milliGRAM(s) Oral two times a day  senna 2 Tablet(s) Oral at bedtime    MEDICATIONS  (PRN):  acetaminophen     Tablet .. 650 milliGRAM(s) Oral every 6 hours PRN Temp greater or equal to 38C (100.4F), Mild Pain (1 - 3)  bisacodyl Suppository 10 milliGRAM(s) Rectal daily PRN Constipation  OLANZapine Injectable 1.25 milliGRAM(s) IntraMuscular every 6 hours PRN agitation

## 2022-09-25 NOTE — PROGRESS NOTE ADULT - NS ATTEND AMEND GEN_ALL_CORE FT
89F with HTN, hypothyroidism, dementia, came from SNF due to AMS, found to have hypoglycemia, course further c/b code stoke rule out CVA 9/23, now back to baseline. Plan: apprec psych recs, apprec neuro recs, cont to monitor for agitation and altered behaviorial status, f/u repeat ct head if pt complies, poss cva given changes in speech, apprec s/s eval definitive recs for diet, monitor clincial course
89F with HTN, hypothyroidism, dementia, came from SNF due to AMS, found to have hypoglycemia now c/b stroke workup due to metabolic encephalopathy PLanL: apprec neuro recs, monitor fsbs, monitor on tele, apprec endocrine recs, f/u MR head, apprec neuro stroke team recs, monitor clinical course
89F with HTN, hypothyroidism, dementia, came from SNF due to AMS, found to have hypoglycemia, course further c/b code stoke rule out CVA 9/23, Plan: worsening behaviorial disturbance 2/2 cva, not a candidate for MR due to uncooperative status, consult palliative in am, dc planning underway, monitor clinical course, guarded to poor prognosis

## 2022-09-25 NOTE — PROGRESS NOTE ADULT - ASSESSMENT
Impression:  89 yo female with dementia admitted with acute confusion but non focal.  CT head reveals chronic microvascular changes and CT angiogram and perfusion unremarkable. Hypoglycemic in ED. Neuro exam was non focal and suggestive of dementia and a metabolic encephalopathy. Yesterday episode of transient confusion and questionable aphasia.  Stroke code called and no TPA since unclear this was a focal event and symptoms resolved. Last PM increased confusion and given Zyprexa. She remains confused and neuro exam remains non focal with confusion. EEG is consistent with diffuse dysfunction but non focal and no seizures seen. CT head 9/24 no acute findings. R/O CVA. R/O metabolic encephalopathy. Worsening  dementia.    REC: MRI brain would require sedation and results will not necessarily change treatment. Family declines MRI. Continue treatment as ordered from neuro standpoint.  consider palliative care consult.  Neuro f/u only as needed.

## 2022-09-25 NOTE — PROGRESS NOTE ADULT - ASSESSMENT
89F with HTN, hypothyroidism, dementia, came from SNF due to AMS, found to have hypoglycemia, course further c/b code stoke rule out CVA 9/23, now back to baseline.    # Probable Metabolic Encephalopathy   # Dementia with Agitation  # Possible CVA  neurology following  s/p code stroke 9/23 for acute AMS while undergoing S+S exam  pt much improved probably at or near baseline, Neuro exam is non focal  CTH showed chronic microvascular changes, repeat study 9/24 w/o significant change  CT Angio and perfusion unremarkable  EEG revealed mild diffuse/multifocal cerebral dysfunction w/o epileptiform activity  defer further neuro workup for now (not a good candidate for MRI), lipid profile reviewed, continue statin and ASA therapies  Psych evaluation appreciated for agitation   Zyprexa 1.25 mg IM Q 6 PRN    # Hypoglycemia  pt found to be hypoglycemic in ED  may be from dehydration and poor po intake  dysphagia screen not completed due to code stroke 9/23  speech evaluation today appreciated, start puree with thins  A1c 5.1  monitor accuchecks, blood sugar stable for now    # Hypothyroid  TSH elevated  increased synthroid 88 mcg daily  appreciate endo consult and recs  will require repeat thyroid function studies as outpatient    # Acute UTI  completed three days of IV rocephin    # Essential HTN  continue lopressor    # DVT ppx  lovenox    updated hcp xochitl today [phone] 89F with HTN, hypothyroidism, dementia, came from SNF due to AMS, found to have hypoglycemia, course further c/b code stoke rule out CVA 9/23, now back to baseline.    # Probable Metabolic Encephalopathy   # Dementia with Agitation  # Possible CVA  neurology following  s/p code stroke 9/23 for acute AMS while undergoing S+S exam  pt much improved probably at or near baseline, Neuro exam is non focal  CTH showed chronic microvascular changes, repeat study 9/24 w/o significant change  CT Angio and perfusion unremarkable  EEG revealed mild diffuse/multifocal cerebral dysfunction w/o epileptiform activity  defer further neuro workup for now (not a good candidate for MRI), lipid profile reviewed, continue statin and ASA therapies  Psych evaluation appreciated for agitation   Zyprexa 1.25 mg IM Q 6 PRN    # Hypoglycemia  pt found to be hypoglycemic in ED  may be from dehydration and poor po intake  dysphagia screen not completed due to code stroke 9/23  speech evaluation today appreciated, start puree with thins  A1c 5.1  monitor accuchecks, blood sugar stable for now    # Hypothyroid  TSH elevated  increased synthroid 88 mcg daily  appreciate endo consult and recs  will require repeat thyroid function studies as outpatient    # Acute UTI  completed three days of IV rocephin    # Essential HTN  continue lopressor    # DVT ppx  lovenox    updated hcp xochitl today [phone]  had extensive conversation with HCP today discussing pro's and con's of obtaining MRI given advanced age, need to sedate and transfer for MRI.  Xochitl HCP is not interested in pursuing MRI at this time.   89F with HTN, hypothyroidism, dementia, came from SNF due to AMS, found to have hypoglycemia, course further c/b code stoke rule out CVA 9/23, now back to baseline.    # Probable Metabolic Encephalopathy   # Dementia with Agitation  # Possible CVA  neurology following  s/p code stroke 9/23 for acute AMS while undergoing S+S exam  pt much improved probably at or near baseline, Neuro exam is non focal  CTH showed chronic microvascular changes, repeat study 9/24 w/o significant change  CT Angio and perfusion unremarkable  EEG revealed mild diffuse/multifocal cerebral dysfunction w/o epileptiform activity  defer further neuro workup for now (not a good candidate for MRI), lipid profile reviewed, continue statin and ASA therapies  Psych evaluation appreciated for agitation   Zyprexa 1.25 mg IM Q 6 PRN    # Hypoglycemia  pt found to be hypoglycemic in ED  may be from dehydration and poor po intake  dysphagia screen not completed due to code stroke 9/23  speech evaluation today appreciated, start puree with thins  A1c 5.1  monitor accuchecks, blood sugar stable for now    # Hypothyroid  TSH elevated  increased synthroid 88 mcg daily  appreciate endo consult and recs  will require repeat thyroid function studies as outpatient    # Acute UTI  completed three days of IV rocephin    # Essential HTN  continue lopressor    # DVT ppx  lovenox    updated hcp xochitl today [phone]  had extensive conversation with HCP today discussing pro's and con's of obtaining MRI given advanced age, need to sedate and transfer for MRI.  Xochitl HCP is not interested in pursuing MRI at this time.   89F with HTN, hypothyroidism, dementia, came from SNF due to AMS, found to have hypoglycemia, course further c/b suspect CVA wth worsening dementia and behaviorial disturbance and failure to thrive    # Probable Metabolic Encephalopathy   # Dementia with Agitation  # FTT 2/2 suspected CVA  neurology following  s/p code stroke 9/23 for acute AMS while undergoing S+S exam  pt much improved probably at or near baseline, Neuro exam is non focal  CTH showed chronic microvascular changes, repeat study 9/24 w/o significant change  CT Angio and perfusion unremarkable  EEG revealed mild diffuse/multifocal cerebral dysfunction w/o epileptiform activity  defer further neuro workup for now (not a good candidate for MRI), lipid profile reviewed, continue statin and ASA therapies  Psych evaluation appreciated for agitation   Zyprexa 1.25 mg IM Q 6 PRN    # Hypoglycemia  pt found to be hypoglycemic in ED  may be from dehydration and poor po intake  dysphagia screen not completed due to code stroke 9/23  speech evaluation today appreciated, start puree with thins  A1c 5.1  monitor accuchecks, blood sugar stable for now    # Hypothyroid  TSH elevated  increased synthroid 88 mcg daily  appreciate endo consult and recs  will require repeat thyroid function studies as outpatient    # Acute UTI  completed three days of IV rocephin    # Essential HTN  continue lopressor    # DVT ppx  lovenox    updated hcp xochitl today [phone]  had extensive conversation with HCP today discussing pro's and con's of obtaining MRI given advanced age, need to sedate and transfer for MRI.  Xochitl HCP is not interested in pursuing MRI at this time.

## 2022-09-26 ENCOUNTER — TRANSCRIPTION ENCOUNTER (OUTPATIENT)
Age: 87
End: 2022-09-26

## 2022-09-26 VITALS
SYSTOLIC BLOOD PRESSURE: 124 MMHG | RESPIRATION RATE: 18 BRPM | OXYGEN SATURATION: 93 % | TEMPERATURE: 98 F | WEIGHT: 113.98 LBS | HEART RATE: 67 BPM | DIASTOLIC BLOOD PRESSURE: 66 MMHG

## 2022-09-26 LAB
ANION GAP SERPL CALC-SCNC: 7 MMOL/L — SIGNIFICANT CHANGE UP (ref 5–17)
BUN SERPL-MCNC: 24 MG/DL — HIGH (ref 7–23)
CALCIUM SERPL-MCNC: 8.6 MG/DL — SIGNIFICANT CHANGE UP (ref 8.4–10.5)
CHLORIDE SERPL-SCNC: 104 MMOL/L — SIGNIFICANT CHANGE UP (ref 96–108)
CO2 SERPL-SCNC: 29 MMOL/L — SIGNIFICANT CHANGE UP (ref 22–31)
CREAT SERPL-MCNC: 0.81 MG/DL — SIGNIFICANT CHANGE UP (ref 0.5–1.3)
EGFR: 69 ML/MIN/1.73M2 — SIGNIFICANT CHANGE UP
GLUCOSE BLDC GLUCOMTR-MCNC: 156 MG/DL — HIGH (ref 70–99)
GLUCOSE BLDC GLUCOMTR-MCNC: 84 MG/DL — SIGNIFICANT CHANGE UP (ref 70–99)
GLUCOSE SERPL-MCNC: 80 MG/DL — SIGNIFICANT CHANGE UP (ref 70–99)
HCT VFR BLD CALC: 34.1 % — LOW (ref 34.5–45)
HGB BLD-MCNC: 11.4 G/DL — LOW (ref 11.5–15.5)
MCHC RBC-ENTMCNC: 31.7 PG — SIGNIFICANT CHANGE UP (ref 27–34)
MCHC RBC-ENTMCNC: 33.4 GM/DL — SIGNIFICANT CHANGE UP (ref 32–36)
MCV RBC AUTO: 94.7 FL — SIGNIFICANT CHANGE UP (ref 80–100)
NRBC # BLD: 0 /100 WBCS — SIGNIFICANT CHANGE UP (ref 0–0)
PLATELET # BLD AUTO: 195 K/UL — SIGNIFICANT CHANGE UP (ref 150–400)
POTASSIUM SERPL-MCNC: 3.6 MMOL/L — SIGNIFICANT CHANGE UP (ref 3.5–5.3)
POTASSIUM SERPL-SCNC: 3.6 MMOL/L — SIGNIFICANT CHANGE UP (ref 3.5–5.3)
RBC # BLD: 3.6 M/UL — LOW (ref 3.8–5.2)
RBC # FLD: 13.1 % — SIGNIFICANT CHANGE UP (ref 10.3–14.5)
SARS-COV-2 RNA SPEC QL NAA+PROBE: SIGNIFICANT CHANGE UP
SODIUM SERPL-SCNC: 140 MMOL/L — SIGNIFICANT CHANGE UP (ref 135–145)
WBC # BLD: 5.14 K/UL — SIGNIFICANT CHANGE UP (ref 3.8–10.5)
WBC # FLD AUTO: 5.14 K/UL — SIGNIFICANT CHANGE UP (ref 3.8–10.5)

## 2022-09-26 PROCEDURE — 93005 ELECTROCARDIOGRAM TRACING: CPT

## 2022-09-26 PROCEDURE — 92610 EVALUATE SWALLOWING FUNCTION: CPT

## 2022-09-26 PROCEDURE — 84100 ASSAY OF PHOSPHORUS: CPT

## 2022-09-26 PROCEDURE — 80061 LIPID PANEL: CPT

## 2022-09-26 PROCEDURE — 97161 PT EVAL LOW COMPLEX 20 MIN: CPT

## 2022-09-26 PROCEDURE — 99239 HOSP IP/OBS DSCHRG MGMT >30: CPT

## 2022-09-26 PROCEDURE — 84480 ASSAY TRIIODOTHYRONINE (T3): CPT

## 2022-09-26 PROCEDURE — 83525 ASSAY OF INSULIN: CPT

## 2022-09-26 PROCEDURE — 70450 CT HEAD/BRAIN W/O DYE: CPT | Mod: MA

## 2022-09-26 PROCEDURE — 99497 ADVNCD CARE PLAN 30 MIN: CPT | Mod: 25

## 2022-09-26 PROCEDURE — 70496 CT ANGIOGRAPHY HEAD: CPT | Mod: MA

## 2022-09-26 PROCEDURE — 85730 THROMBOPLASTIN TIME PARTIAL: CPT

## 2022-09-26 PROCEDURE — 99223 1ST HOSP IP/OBS HIGH 75: CPT | Mod: FS

## 2022-09-26 PROCEDURE — 36415 COLL VENOUS BLD VENIPUNCTURE: CPT

## 2022-09-26 PROCEDURE — 84484 ASSAY OF TROPONIN QUANT: CPT

## 2022-09-26 PROCEDURE — 84443 ASSAY THYROID STIM HORMONE: CPT

## 2022-09-26 PROCEDURE — 80048 BASIC METABOLIC PNL TOTAL CA: CPT

## 2022-09-26 PROCEDURE — 83527 ASSAY OF INSULIN: CPT

## 2022-09-26 PROCEDURE — 82550 ASSAY OF CK (CPK): CPT

## 2022-09-26 PROCEDURE — 84436 ASSAY OF TOTAL THYROXINE: CPT

## 2022-09-26 PROCEDURE — 84439 ASSAY OF FREE THYROXINE: CPT

## 2022-09-26 PROCEDURE — 80053 COMPREHEN METABOLIC PANEL: CPT

## 2022-09-26 PROCEDURE — 82553 CREATINE MB FRACTION: CPT

## 2022-09-26 PROCEDURE — 85025 COMPLETE CBC W/AUTO DIFF WBC: CPT

## 2022-09-26 PROCEDURE — 87635 SARS-COV-2 COVID-19 AMP PRB: CPT

## 2022-09-26 PROCEDURE — 84681 ASSAY OF C-PEPTIDE: CPT

## 2022-09-26 PROCEDURE — 95812 EEG 41-60 MINUTES: CPT

## 2022-09-26 PROCEDURE — 87086 URINE CULTURE/COLONY COUNT: CPT

## 2022-09-26 PROCEDURE — 85027 COMPLETE CBC AUTOMATED: CPT

## 2022-09-26 PROCEDURE — 70498 CT ANGIOGRAPHY NECK: CPT | Mod: MA

## 2022-09-26 PROCEDURE — 83735 ASSAY OF MAGNESIUM: CPT

## 2022-09-26 PROCEDURE — 71045 X-RAY EXAM CHEST 1 VIEW: CPT

## 2022-09-26 PROCEDURE — 93306 TTE W/DOPPLER COMPLETE: CPT

## 2022-09-26 PROCEDURE — 83605 ASSAY OF LACTIC ACID: CPT

## 2022-09-26 PROCEDURE — 81001 URINALYSIS AUTO W/SCOPE: CPT

## 2022-09-26 PROCEDURE — 82962 GLUCOSE BLOOD TEST: CPT

## 2022-09-26 PROCEDURE — 0042T: CPT | Mod: MA

## 2022-09-26 PROCEDURE — 83036 HEMOGLOBIN GLYCOSYLATED A1C: CPT

## 2022-09-26 PROCEDURE — 85610 PROTHROMBIN TIME: CPT

## 2022-09-26 PROCEDURE — 92526 ORAL FUNCTION THERAPY: CPT

## 2022-09-26 PROCEDURE — 99285 EMERGENCY DEPT VISIT HI MDM: CPT

## 2022-09-26 PROCEDURE — 96374 THER/PROPH/DIAG INJ IV PUSH: CPT

## 2022-09-26 RX ORDER — OLANZAPINE 15 MG/1
1.25 TABLET, FILM COATED ORAL
Qty: 0 | Refills: 0 | DISCHARGE
Start: 2022-09-26

## 2022-09-26 RX ORDER — CELECOXIB 200 MG/1
1 CAPSULE ORAL
Qty: 0 | Refills: 0 | DISCHARGE

## 2022-09-26 RX ORDER — ATORVASTATIN CALCIUM 80 MG/1
1 TABLET, FILM COATED ORAL
Qty: 0 | Refills: 0 | DISCHARGE
Start: 2022-09-26

## 2022-09-26 RX ORDER — LEVOTHYROXINE SODIUM 125 MCG
1 TABLET ORAL
Qty: 0 | Refills: 0 | DISCHARGE
Start: 2022-09-26

## 2022-09-26 RX ORDER — LEVOTHYROXINE SODIUM 125 MCG
1 TABLET ORAL
Qty: 0 | Refills: 0 | DISCHARGE

## 2022-09-26 RX ADMIN — Medication 12.5 MILLIGRAM(S): at 05:35

## 2022-09-26 RX ADMIN — Medication 81 MILLIGRAM(S): at 11:31

## 2022-09-26 RX ADMIN — Medication 88 MICROGRAM(S): at 05:35

## 2022-09-26 RX ADMIN — Medication 5 MILLIGRAM(S): at 05:34

## 2022-09-26 NOTE — DISCHARGE NOTE NURSING/CASE MANAGEMENT/SOCIAL WORK - PATIENT PORTAL LINK FT
You can access the FollowMyHealth Patient Portal offered by [facility] by registering at the following website: [email or web address]. By joining [facility]’s FollowMyHealth portal, you will also be able to view your health information using other applications (apps) compatible with our system.

## 2022-09-26 NOTE — CONSULT NOTE ADULT - ASSESSMENT
Impression:  89 yo female with dementia admitted with acute confusion but non focal.  CT head reveals chronic microvascular changes and CT angiogram and perfusion unremarkable. Hypoglycemic in ED. Neuro exam is non focal and suggestive of dementia and a metabolic encephalopathy.  R/O CVA but presentation is non focal.    REC:  Toxic metabolic w/u, EEG, observation from neuro standpoint.
A/P 89F with HTN, hypothyroidism, dementia, came from SNF due to AMS, found to have hypoglycemia, course further c/b suspect CVA wth worsening dementia and behaviorial disturbance and failure to thrive    Assessment/plans:      Probable Metabolic Encephalopathy    Dementia with Agitation  FTT      suspected CVA- ruled out   neurology following- consult appreciated   s/p code stroke 9/23 for acute AMS   pt much improved  at or near baseline,   Neuro exam is non focal  CTH showed chronic microvascular changes, repeat study 9/24 w/o significant change  CT Angio and perfusion unremarkable    Agitation  Psych evaluation    Zyprexa 1.25 mg IM Q 6 PRN     Hypothyroid  TSH elevated  increased synthroid 88 mcg daily  appreciate endo consult      Acute UTI  completed three days of IV rocephin     Essential HTN  continue lopressor      Palliative :  asked today for GOC assist, pt w/ advancing dementia/FTT/ periods agitation.   Case d/w med team this AM. Chart reviewed.  Pt seen bedside.  She was alert, spoke few words, confused, knew her name and family names only.  Not following commands.  Called [facility] where resident is a LTC resident.  Obtained most recent HCP form /information. Confirmed Mario uBchanan is pts HCP.     Family aware pt w/ advancing dementia, ftt, and agitated/uncooperative  at times.  See Goc Note above.  Pt Dnr/Dni , family says pt would not want feeding tubes  , agree to return to [facility] on comfort measures, and not to return to hospital.  New [facility] form completed today and on chart.  Discussed all w/ Med team today .

## 2022-09-26 NOTE — PHYSICAL THERAPY INITIAL EVALUATION ADULT - ADDITIONAL COMMENTS
pt is poor informant, hx per chart. pt is from [facility] long term. non ambulatory, requires assist for all ADL's

## 2022-09-26 NOTE — DISCHARGE NOTE PROVIDER - HOSPITAL COURSE
Hospital Course  89y Female with PMH Dementia admitted with acute confusion, found to be hypoglycemic in ED, due to suspected poor oral intake.  Also found with pyuria, managed with three days of Iv rocephin.  CTH in ED revealed chronic microvascular changes and CT angiogram and perfusion was unremarkable.  Pt was admitted with metabolic encephalopathy, worsening dementia. Also found with elevated TSH, levothyroxine dose was adjusted.  Recommend outpatient follow up for repeat thyroid function studies. Course c/b code stroke called on 9/23 for acute mental status alterations while undergoing speech and swallow evaluation.  Neurology expertise appreciated, exam essentially non focal and suggestive of dementia and metabolic encephalopathy.  Pt given zyprexa PRN for agitation.  Repeat CTH unremarkable, EEG c/w diffuse dysfunction but non focal and no seizures seen.  Spoke with family and decided pt would not be an ideal candidate for MRI at this time given advanced age, need for sedation as the results would not necessarily .  Palliative consult recommended for Goals of care discussion.  Clinically stable and medically cleared for discharge at this time back to NH.    Source of Infection:  Antibiotic / Last Day: 3 days of IV Rocephin    Palliative Care / Advanced Care Planning  Code Status: Full Code  Patient/Family agreeable to Hospice/Palliative (Y/N)?  Summary of Goals of Care Conversation:    Discharging Provider:  Matthew Madrid NP  Contact Info: Cell [phone] - Please call with any questions or concerns.    Outpatient Provider: None documented on h+P           Hospital Course  89y Female with PMH Dementia admitted with acute confusion, found to be hypoglycemic in ED, due to suspected poor oral intake.  Also found with pyuria, managed with three days of Iv rocephin.  CTH in ED revealed chronic microvascular changes and CT angiogram and perfusion was unremarkable.  Pt was admitted with metabolic encephalopathy, worsening dementia. Also found with elevated TSH, levothyroxine dose was adjusted.  Recommend outpatient follow up for repeat thyroid function studies. Course c/b code stroke called on 9/23 for acute mental status alterations while undergoing speech and swallow evaluation.  Neurology expertise appreciated, exam essentially non focal and suggestive of dementia and metabolic encephalopathy.  Pt given zyprexa PRN for agitation.  Repeat CTH unremarkable, EEG c/w diffuse dysfunction but non focal and no seizures seen.  Spoke with family and decided pt would not be an ideal candidate for MRI at this time given advanced age, need for sedation as the results would not necessarily .  Palliative consult recommended for Goals of care discussion.  Clinically stable and medically cleared for discharge at this time back to NH.    Source of Infection:  Antibiotic / Last Day: 3 days of IV Rocephin    Palliative Care / Advanced Care Planning  Code Status: Full Code  Patient/Family agreeable to Hospice/Palliative (Y/N)?  Summary of Goals of Care Conversation:    Discharging Provider:  Matthew Madrid NP  Contact Info: Cell [phone] - Please call with any questions or concerns.    Outpatient Provider: Dr. Buckner at [facility] - aware           Hospital Course  89y Female with PMH Dementia admitted with acute confusion, found to be hypoglycemic in ED, due to suspected poor oral intake.  Also found with pyuria, managed with three days of Iv rocephin.  CTH in ED revealed chronic microvascular changes and CT angiogram and perfusion was unremarkable.  Pt was admitted with metabolic encephalopathy, worsening dementia. Also found with elevated TSH, levothyroxine dose was adjusted.  Recommend outpatient follow up for repeat thyroid function studies. Course c/b code stroke called on 9/23 for acute mental status alterations while undergoing speech and swallow evaluation.  Neurology expertise appreciated, exam essentially non focal and suggestive of dementia and metabolic encephalopathy.  Pt given zyprexa PRN for agitation.  Repeat CTH unremarkable, EEG c/w diffuse dysfunction but non focal and no seizures seen.  Spoke with family and decided pt would not be an ideal candidate for MRI at this time given advanced age, need for sedation as the results would not necessarily .  Palliative consult recommended for Goals of care discussion with comfort measures only status.  Clinically stable and medically cleared for discharge at this time back to NH.    Source of Infection:  Antibiotic / Last Day: 3 days of IV Rocephin    Palliative Care / Advanced Care Planning  Code Status: updated MOLST: DNR/DNI, DNH, comfort measures only  Patient/Family agreeable to Hospice/Palliative (Y/N)?  Summary of Goals of Care Conversation:    Discharging Provider:  Matthew Madrid NP  Contact Info: Cell [phone] - Please call with any questions or concerns.    Outpatient Provider: Dr. Buckner at [facility] - aware

## 2022-09-26 NOTE — PROGRESS NOTE ADULT - ASSESSMENT
89F with HTN, hypothyroidism, dementia, came from SNF due to AMS, found to have hypoglycemia, course further c/b suspect CVA wth worsening dementia and behaviorial disturbance and failure to thrive    # Probable Metabolic Encephalopathy   # Dementia with Agitation  # FTT 2/2 suspected CVA  neurology following  s/p code stroke 9/23 for acute AMS while undergoing S+S exam  pt much improved probably at or near baseline, Neuro exam is non focal  CTH showed chronic microvascular changes, repeat study 9/24 w/o significant change  CT Angio and perfusion unremarkable  EEG revealed mild diffuse/multifocal cerebral dysfunction w/o epileptiform activity  defer further neuro workup for now (not a good candidate for MRI), lipid profile reviewed, continue statin and ASA therapies  Psych evaluation appreciated for agitation   Zyprexa 1.25 mg IM Q 6 PRN  Palliative evaluation for GOC    # Hypoglycemia  pt found to be hypoglycemic in ED  may be from dehydration and poor po intake  dysphagia screen not completed due to code stroke 9/23  speech evaluation today appreciated, start puree with thins  A1c 5.1  monitor accuchecks, blood sugar stable for now    # Hypothyroid  TSH elevated  increased synthroid 88 mcg daily  appreciate endo consult and recs  will require repeat thyroid function studies as outpatient    # Acute UTI  completed three days of IV rocephin    # Essential HTN  continue lopressor    # DVT ppx  lovenox    updated hcp xochitl today [phone]  spoke to Xochitl about importance of GOC at this time, palliative evaluation ordered, Xochitl mentioned she would reach out to family in order to have that conversation.

## 2022-09-26 NOTE — CONSULT NOTE ADULT - SUBJECTIVE AND OBJECTIVE BOX
Neurology consult    DAPHNE [id number]     Patient is a 89y old  Female who presents with a chief complaint of altered MS, lethargy (21 Sep 2022 22:33)      HPI:  89 y/o F w/ HTN, HLD, Hypothyroidism, Dementia, BIBEMS, from NH because of altered MS, lethargy, not answering questions (pt usually verbal).  Pt was last known well at 7:30 AM.  FS was 82.  CT head, CTA head/neck w/ no acute stroke nor occlusion. While in the ED, pt noted to have FS of 53, was given an amp of D50.  Pt noted to be more responsive.   NIHSS score is 9. (21 Sep 2022 22:33)      REVIEW OF SYSTEMS:  Confused    MEDICATIONS    acetaminophen     Tablet .. 650 milliGRAM(s) Oral every 6 hours PRN  aspirin enteric coated 81 milliGRAM(s) Oral daily  atorvastatin 40 milliGRAM(s) Oral at bedtime  bisacodyl Suppository 10 milliGRAM(s) Rectal daily PRN  cefTRIAXone   IVPB 1000 milliGRAM(s) IV Intermittent every 24 hours  enoxaparin Injectable 40 milliGRAM(s) SubCutaneous every 24 hours  levothyroxine 75 MICROGram(s) Oral daily  metoprolol tartrate 12.5 milliGRAM(s) Oral two times a day  oxybutynin 5 milliGRAM(s) Oral two times a day  senna 2 Tablet(s) Oral at bedtime      PMH: HTN (hypertension)    Dementia    Anxiety         PSH: No significant past surgical history        Family history:   FAMILY HISTORY:  No pertinent family history in first degree relatives        SOCIAL HISTORY:  No history of tobacco or alcohol use     Allergies    No Known Allergies    Intolerances        Height (cm): 160 ( @ 15:26)  Weight (kg): 0 ( @ 15:26)  BMI (kg/m2): 0 ( @ 15:26)    Vital Signs Last 24 Hrs  T(C): 37.1 (21 Sep 2022 22:33), Max: 37.1 (21 Sep 2022 22:33)  T(F): 98.8 (21 Sep 2022 22:33), Max: 98.8 (21 Sep 2022 22:33)  HR: 89 (22 Sep 2022 07:43) (77 - 110)  BP: 120/81 (22 Sep 2022 07:43) (120/81 - 164/79)  BP(mean): 86 (22 Sep 2022 07:43) (86 - 86)  RR: 16 (22 Sep 2022 07:43) (15 - 18)  SpO2: 98% (22 Sep 2022 07:43) (97% - 99%)    Parameters below as of 22 Sep 2022 07:43  Patient On (Oxygen Delivery Method): room air          On Neurological Examination:    Head: normocephalic Neck: supple no carotid bruits    Mental Status - Patient is lethargic confused poor orientation follows a simple command    Cranial Nerves -Right eye opacity, EOMI, VF full confrontation  no VII XII weakness    Motor Exam : Moves all 4 limbs equal non focal diffusely weak    Sensory:  equal pin touch    Reflexes:  symmetric  plantars downgoing    Gait -  not tested                                                         LABS:  CBC Full  -  ( 21 Sep 2022 15:35 )  WBC Count : 5.99 K/uL  RBC Count : 3.88 M/uL  Hemoglobin : 12.4 g/dL  Hematocrit : 37.7 %  Platelet Count - Automated : 207 K/uL  Mean Cell Volume : 97.2 fl  Mean Cell Hemoglobin : 32.0 pg  Mean Cell Hemoglobin Concentration : 32.9 gm/dL  Auto Neutrophil # : 4.47 K/uL  Auto Lymphocyte # : 1.02 K/uL  Auto Monocyte # : 0.43 K/uL  Auto Eosinophil # : 0.01 K/uL  Auto Basophil # : 0.03 K/uL  Auto Neutrophil % : 74.6 %  Auto Lymphocyte % : 17.0 %  Auto Monocyte % : 7.2 %  Auto Eosinophil % : 0.2 %  Auto Basophil % : 0.5 %    Urinalysis Basic - ( 21 Sep 2022 19:15 )    Color: Yellow / Appearance: Clear / S.010 / pH: x  Gluc: x / Ketone: Moderate  / Bili: Negative / Urobili: Negative   Blood: x / Protein: 100 / Nitrite: Positive   Leuk Esterase: Small / RBC: 11-25 /HPF / WBC 6-10 /HPF   Sq Epi: x / Non Sq Epi: Neg.-Few / Bacteria: Moderate /HPF      -    136  |  99  |  29<H>  ----------------------------<  65<L>  4.1   |  23  |  0.98    Ca    9.2      21 Sep 2022 15:35    TPro  7.2  /  Alb  3.7  /  TBili  1.2  /  DBili  x   /  AST  26  /  ALT  15  /  AlkPhos  29<L>  09    LIVER FUNCTIONS - ( 21 Sep 2022 15:35 )  Alb: 3.7 g/dL / Pro: 7.2 g/dL / ALK PHOS: 29 U/L / ALT: 15 U/L / AST: 26 U/L / GGT: x           Hemoglobin A1C:       PT/INR - ( 21 Sep 2022 15:35 )   PT: 11.6 sec;   INR: 1.00 ratio         PTT - ( 21 Sep 2022 15:35 )  PTT:30.2 sec          RADIOLOGY                  < from: CT Angio Brain Stroke Protocol  w/ IV Cont (22 @ 15:47) >    ACC: [id number] EXAM:  CT ANGIO NECK STROKE PROTCL IC                        ACC: [id number] EXAM:  CT BRAIN PERFUSION MAPS STROKE                        ACC: [id number] EXAM:  CT ANGIO BRAIN STROKE PROTC IC                          PROCEDURE DATE:  2022          INTERPRETATION:  CT ANGIO BRAIN STROKE PROTOCOL, CT ANGIO NECK STROKE   PROTOCOL, CT PERFUSION WITH MAPS STROKE PROTOCOL    HISTORY: Stroke code. Additional history per EMR: "Patient BIB EMS from   NH with new onset AMS and aphasia, last known well 14:15. Finger stick 82   per EMS, 62 in triage and then 53 in CT scan. Code stroke called as per   provider. Patient also had recent sodium imbalance per EMS". Not a tPA   candidate, outside window. Persistent altered mental status, despite   treatment hyperglycemia.    COMPARISON: CT head 2020, CT cervical spine 3/5/2018    TECHNIQUE: Noncontrast CT images were obtained from the skull base to the   vertex. CT angiographic evaluation was performed consisting of contiguous   axialsections scanned from the aortic arch to the vertex level following   the intravenous administration of 150 mL of Omnipaque 350 (0 mL   discarded). The acquired data was postprocessed to generate 3D/MIP   coronal and sagittal reformats . During IV contrast administration,   serial thin section CT images of the brain were obtained for CT   perfusion. The raw data was sent to rapid ischemia view for post   processing.    FINDINGS:    Please see separate report for noncontrast CT head findings.    CTA HEAD:  Anterior circulation:  The bilateral petrous, cavernous, clinoid, supraclinoid and terminal   segments of the bilateral intracranial internal carotid arteries are   patent. Bilateral ophthalmic artery origins are patent.  Mild focal stenosisat right cavernous/clinoid segment, due to calcified   atherosclerosis.    The bilateral A1 and A2 segments, as well as the anterior communicating   complex, are patent.  The M1 and M2 segments, as well as the bifurcations, of the bilateral   middle cerebral arteries are patent.    Posterior circulation:  Bilateral intracranial vertebral arteries are patent. Patent right   posterior inferior cerebellar artery, with normal origins.  The basilar artery is patent.  Bilateral P1 segments are hypoplastic. Patent bilateral posterior   communicating arteries supply the patent bilateral posterior cerebral   arteries, suggesting their fetal type origins.  Patent bilateral superior cerebellar arteries, with normal origins.    No proximal proximal largevessel arterial occlusion, flow-limiting   stenosis, dissection, arteriovenous malformation or definite saccular   aneurysm.    CTA NECK:  The aortic arch is not imaged. Origins of the great vessels are   incompletely imaged.    Brachiocephalic artery, Bilateral Common and Internal Carotid Arteries:  The imaged portions of the brachiocephalic bilateral common carotid   arteries are patent. The bilateral common carotid arteries, common   carotid artery bifurcations, and cervical internal carotid arteries are   patent.  Calcified atherosclerotic plaque at bilateral carotid bulbs/bifurcations,   without significant stenosis.    Bilateral Subclavian and Vertebral Arteries:  Visualized portions of the bilateral subclavian arteries are patent.  Bilateral vertebral artery origins are patent.  Bilateral extracranial arteries are patent.    No evidence for arterial occlusion, flow-limiting stenosis,   pseudoaneurysm or dissection.    Exaggerated cervical lordosis, likely positional in nature. Grade 1   anterolisthesis of  C4-C5, likely positional in nature. Multilevel   degenerative changes are noted within the cervical spine.  Streak artifact from dental amalgam limits evaluation of the oral cavity   and adjacent structures. The thyroid glandis unremarkable.  Small 3 mm calcified granuloma within the left upper lobe. Biapical   pleural/parenchymal scarring noted. Nonspecific sulcal distention of the   thoracic esophagus, with internal debris.    Vascular measurements of stenosis were made according to the NASCET   criteria.    CT PERFUSION:  Examination is degraded due to suboptimal contrast bolus timing and   patient motion. Within this limitation, perfusion parameters are reported   as follows:  CBF < 30%: 0 mL  TMax > 6s: 0 mL  Mismatch volume: 0 mL  Mismatch ratio: None    No CBF less than 30% to suggest acute core ischemic infarct. No areas of   Tmax greater than 6 seconds to suggest critically hypoperfused tissue at   risk.      IMPRESSION:    CTA brain:  1.  No proximal large vessel arterial occlusion, flow-limiting stenosis   or dissection within the brain.    CTA neck:  2. No arterial occlusion or flow-limiting stenosis within the neck   (within the limitation that the aortic arch and great vessel origins are   not imaged).    CT perfusion:  3.  No acute core ischemic infarct or critically hypoperfused tissue at   risk (within limitations of slightly degraded examination). If there is   continued clinical suspicion for evolving acute cerebral ischemia,   considerfurther evaluation with MRI.      < from: CT Brain Stroke Protocol (22 @ 15:42) >    ACC: [id number] EXAM:  CT BRAIN STROKE PROTOCOL                          PROCEDURE DATE:  2022          INTERPRETATION:  CT HEAD WITHOUT IV CONTRAST    HISTORY: Stroke code. Additional history per EMR: "Patient BIB EMS from   [facility] with new onset AMS and aphasia, last known well 14:15. Finger stick 82   per EMS, 62 in triage and then 53 in CT scan. Code stroke called as per   provider. Patient also had recent sodium imbalance per EMS". Not a tPA   candidate, outside window. Persistent altered mental status, despite   treatment hyperglycemia.    COMPARISON: CT head 2020    TECHNIQUE: Contiguous axial images were obtained from the skull base to   the vertex without the administration of intravenous contrast. Coronal   and sagittal reformats were also obtained    FINDINGS:    There is no acute intracranial hemorrhage or major vascular distribution   infarction.    No acute intracranial hemorrhage is suspected on RAPID postprocessed   images.    A 1.8 cm hypodensity within the right parietal corona radiata, appears   new since 2020 (image 22, series 2; image 23, series 602). This is   nonspecific but likely reflects interval chronic infarct, given   attenuation near CSF.    Additional scattered hypodensities within the subcortical/periventricular   white matter, bilateral gangliocapsular regions/thalami and raiza,   nonspecific but most commonly a sequela of chronic small vessel ischemia   and/or prior chronic lacunar infarcts.  Punctate nonspecific basal ganglia calcifications.    There is no mass effect, edema or midline shift. The basal cisterns are   patent.  There is no hydrocephalus.  No extra-axial collection is seen.    Normal pituitary gland configuration. Nonspecific pineal gland   calcifications.  No cerebellar tonsillar ectopia/herniation.    The mastoid/middle ear cavities are clear. A 1.2 cm soft tissue density   within the right external auditory canal, likely impacted cerumen.   Correlate with direct visualization.  Paranasal sinuses are grossly clear. Changes from bilateral lens   placement surgery. Nonspecific bilateral scleral calcifications.    No acute calvarial fracture is identified.      IMPRESSION:    1.  No CT evidence for acute intracranial hemorrhage, territorial   infarction or mass effect. If the patient has persistent symptoms and/or   new focal neurologic deficits, consider further evaluation with MRI.    2.  Right parietal corona radiata hypodensity (1.8 cm), new since   2020. This is nonspecific but likely reflects interval chronic   infarct. Consider further reduction MRI if there is clinical suspicion   for small acute infarct superimposed on chronic changes.        Findings were discussed with ordering provider (Dr. Dario Villatoro) via   telephone on 2022 at 3:34 PM with verbal read back.    --- End of Report ---            BISHNU DAVIDSON MD; Attending Radiologist    < end of copied text >    
HPI: 89 y/o F w/ HTN, HLD, Hypothyroidism, Dementia, BIBEMS, from NH because of altered MS, lethargy, not answering questions (pt usually verbal).  Pt was last known well at 7:30 AM, on day of admission   FS was 82.  CT head, CTA head/neck w/ no acute stroke nor occlusion. While in the ED, pt noted to have FS of 53, was given an amp of D50.  Pt noted to be more responsive.   NIHSS score is 9.      PAST MEDICAL & SURGICAL HISTORY:  HTN (hypertension)      Dementia      Anxiety      No significant past surgical history          SOCIAL HISTORY:    Admitted from:   [facility]    Substance abuse history:              Tobacco hx:                  Alcohol hx:              Home Opioid hx:  Church:                                    Preferred Language:    Surrogate/HCP/:  Tarik Branch  [phone]              Phone#:    FAMILY HISTORY:  No pertinent family history in first degree relatives      Baseline ADLs (prior to admission):    Allergies    No Known Allergies    Intolerances        Review of Systems:  Unable to obtain due to poor mentation    MEDICATIONS  (STANDING):  aspirin  chewable 81 milliGRAM(s) Oral daily  atorvastatin 40 milliGRAM(s) Oral at bedtime  enoxaparin Injectable 40 milliGRAM(s) SubCutaneous every 24 hours  influenza  Vaccine (HIGH DOSE) 0.7 milliLiter(s) IntraMuscular once  levothyroxine 88 MICROGram(s) Oral daily  metoprolol tartrate 12.5 milliGRAM(s) Oral two times a day  oxybutynin 5 milliGRAM(s) Oral two times a day  senna 2 Tablet(s) Oral at bedtime    MEDICATIONS  (PRN):  acetaminophen     Tablet .. 650 milliGRAM(s) Oral every 6 hours PRN Temp greater or equal to 38C (100.4F), Mild Pain (1 - 3)  bisacodyl Suppository 10 milliGRAM(s) Rectal daily PRN Constipation  OLANZapine Injectable 1.25 milliGRAM(s) IntraMuscular every 6 hours PRN agitation      PHYSICAL EXAM:    Vital Signs Last 24 Hrs  T(C): 36.7 (26 Sep 2022 05:30), Max: 36.7 (26 Sep 2022 05:30)  T(F): 98.1 (26 Sep 2022 05:30), Max: 98.1 (26 Sep 2022 05:30)  HR: 67 (26 Sep 2022 05:30) (62 - 104)  BP: 124/66 (26 Sep 2022 05:30) (124/66 - 170/55)  BP(mean): --  RR: 18 (26 Sep 2022 05:30) (18 - 19)  SpO2: 93% (26 Sep 2022 05:30) (93% - 99%)    Parameters below as of 26 Sep 2022 05:30  Patient On (Oxygen Delivery Method): room air        General: alert  oriented x 1, spoke few words, confused    Karnofsky Performance Score/Palliative Performance Status Version2:    30 %  PPSV: 30%  HEENT: normal  dry mouth/lips   Lungs: ess clear dim to base, poor insp effort   CV: normal rate   GI: normal  , abd soft, n/t + bs    : normal  incontinent    Musculoskeletal: normal/ contracted  w/ weakness  no edema            Skin: normal , pale w/d   Neuro: + deficits oriented to self, family names, few words, does not follow commands , confused   Oral intake ability: minimal /moderate  needs full assist   diet: pureed w/ thins     LABS:                        11.4   5.14  )-----------( 195      ( 26 Sep 2022 06:50 )             34.1     09-26    140  |  104  |  24<H>  ----------------------------<  80  3.6   |  29  |  0.81    Ca    8.6      26 Sep 2022 06:50          RADIOLOGY & ADDITIONAL STUDIES: < from: CT Head No Cont (09.24.22 @ 17:23) >    ACC: [id number] EXAM:  CT BRAIN                          PROCEDURE DATE:  09/24/2022          INTERPRETATION:  CLINICAL INDICATION:  Encephalopathy    TECHNIQUE: Noncontrast CT examination of the head was performed using   contiguous 5 mm CT images.    COMPARISON: 9/23/2022    FINDINGS:    There is no evidence of mass or acute intracranial hemorrhage. Ventricles   and sulci are normal in size and configuration for the patient's stated   age. No midline shift or other significant mass effect is noted. There is   no CT evidence of acute territorial infarct. 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 There are periventricular white   matter hypodensities that are nonspecific in nature but may reflect   chronic ischemic microvascular disease.    The visualized paranasal sinuses and tympanomastoid spaces are clear.   Orbits and orbital contents are unremarkable.    There is no depressed calvarial fracture.        IMPRESSION:    No evidence of acute intracranial hemorrhage, midline shift or CT   evidence of acute territorial infarct. No significant change from the   prior study dated 9/23/2022.    If the patient's symptoms persist, consider short interval follow-up head   CT or brain MRI if there are no MRI contraindications.          ADVANCE DIRECTIVES: HCP  Molst dnr/dni   Advanced Care Planning discussion total time spent:

## 2022-09-26 NOTE — CONSULT NOTE ADULT - TREATMENT GUIDELINES
DNR Order/Comfort measures only/Do not re-hospitalize/No artificial nutrition/Antibiotic trial/IV fluid trial

## 2022-09-26 NOTE — PROGRESS NOTE ADULT - REASON FOR ADMISSION
altered MS, lethargy

## 2022-09-26 NOTE — CONSULT NOTE ADULT - CONVERSATION DETAILS
Spoke with pts relative Alejandra who was HCP, now HCP is Mario moya - form on chart. They are unable to be here at this time due to work and distance. Reviewed pt pmhx and current conditions w/ Mario, he is aware and does visit pt at [facility] where she is a LTC resident. Family aware pts dementia is worsening, and pt at times is agitated and uncooperative with care, and refuses many tests. Family at this point, confirms Dnr/Dni status and I discussed and reviewed feeding tubes w/ Mario today , discussed in dementia does not prolong life and may cause some suffering with possible aspiration events. Mario said pt would not have wanted that anyway and would likely pull any tubes out.   We discussed no feeding tube and also addressed option of comfort measures and not returning to hospital, instead make patient comfort care at [facility].   Family all in agreement, new Molst completed today to reflect pts wishes.  Reviewed above w/ med team today .

## 2022-09-26 NOTE — DISCHARGE NOTE NURSING/CASE MANAGEMENT/SOCIAL WORK - NSDCPEFALRISK_GEN_ALL_CORE
For information on Fall & Injury Prevention, visit: [email or web address] OR  [email or web address] OR  https://www.cdc.gov/steadi/patient.html

## 2022-09-26 NOTE — PROGRESS NOTE ADULT - SUBJECTIVE AND OBJECTIVE BOX
Patient is a 89y old  Female who presents with a chief complaint of altered MS, lethargy (25 Sep 2022 13:20)    Patient seen and examined at bedside.  no acute events overnight    ALLERGIES:  No Known Allergies        Vital Signs Last 24 Hrs  T(F): 98.1 (26 Sep 2022 05:30), Max: 98.1 (26 Sep 2022 05:30)  HR: 67 (26 Sep 2022 05:30) (62 - 104)  BP: 124/66 (26 Sep 2022 05:30) (124/66 - 170/55)  RR: 18 (26 Sep 2022 05:30) (18 - 19)  SpO2: 93% (26 Sep 2022 05:30) (93% - 99%)  I&O's Summary    MEDICATIONS:  acetaminophen     Tablet .. 650 milliGRAM(s) Oral every 6 hours PRN  aspirin  chewable 81 milliGRAM(s) Oral daily  atorvastatin 40 milliGRAM(s) Oral at bedtime  bisacodyl Suppository 10 milliGRAM(s) Rectal daily PRN  enoxaparin Injectable 40 milliGRAM(s) SubCutaneous every 24 hours  influenza  Vaccine (HIGH DOSE) 0.7 milliLiter(s) IntraMuscular once  levothyroxine 88 MICROGram(s) Oral daily  metoprolol tartrate 12.5 milliGRAM(s) Oral two times a day  OLANZapine Injectable 1.25 milliGRAM(s) IntraMuscular every 6 hours PRN  oxybutynin 5 milliGRAM(s) Oral two times a day  senna 2 Tablet(s) Oral at bedtime      PHYSICAL EXAM:  General: NAD, Alert but confused  ENT: MMM, no oral thrush  Neck: Supple, No JVD  Lungs: Clear to auscultation bilaterally, non labored breathing  Cardio: S1S2 regular  Abdomen: Soft, Nontender, Nondistended; Bowel sounds present  Extremities: No cyanosis, No edema  Neuro: Alert, Non focal neuro exam, unable to follow commands    LABS:                        11.4   5.14  )-----------( 195      ( 26 Sep 2022 06:50 )             34.1     09-26    140  |  104  |  24  ----------------------------<  80  3.6   |  29  |  0.81    Ca    8.6      26 Sep 2022 06:50    TPro  7.3  /  Alb  3.4  /  TBili  0.8  /  DBili  x   /  AST  24  /  ALT  <6  /  AlkPhos  29  09-24      PT/INR - ( 23 Sep 2022 14:40 )   PT: 11.5 sec;   INR: 0.99 ratio         PTT - ( 23 Sep 2022 14:40 )  PTT:26.4 sec    CARDIAC MARKERS ( 23 Sep 2022 14:40 )  x     / 21.6 ng/L / 119 U/L / x     / 2.0 ng/mL      09-24 Chol 241 mg/dL LDL -- HDL 81 mg/dL Trig 74 mg/dL              POCT Blood Glucose.: 84 mg/dL (26 Sep 2022 07:57)          Culture - Urine (collected 21 Sep 2022 19:15)  Source: Catheterized Catheterized  Final Report (23 Sep 2022 10:49):    No growth      COVID-19 PCR: NotDetec (09-21-22 @ 15:35)      RADIOLOGY & ADDITIONAL TESTS:    Care Discussed with Consultants/Other Providers:

## 2022-09-26 NOTE — PROGRESS NOTE ADULT - NUTRITIONAL ASSESSMENT
This patient has been assessed with a concern for Malnutrition and has been determined to have a diagnosis/diagnoses of Moderate protein-calorie malnutrition.    This patient is being managed with:   Diet NPO-     Special Instructions for Nursing:  CODE STROKE; NPO until Dysphagia screen or Speech Bedside Swallow Evaluation completed and passed  Entered: Sep 23 2022  1:45PM    
This patient has been assessed with a concern for Malnutrition and has been determined to have a diagnosis/diagnoses of Moderate protein-calorie malnutrition.    This patient is being managed with:   Diet Pureed-  Entered: Sep 25 2022  8:22AM    
This patient has been assessed with a concern for Malnutrition and has been determined to have a diagnosis/diagnoses of Moderate protein-calorie malnutrition.    This patient is being managed with:   Diet Pureed-  Entered: Sep 25 2022  8:22AM    
This patient has been assessed with a concern for Malnutrition and has been determined to have a diagnosis/diagnoses of Moderate protein-calorie malnutrition.    This patient is being managed with:   Diet NPO-     Special Instructions for Nursing:  CODE STROKE; NPO until Dysphagia screen or Speech Bedside Swallow Evaluation completed and passed  Entered: Sep 23 2022  1:45PM      This patient has been assessed with a concern for Malnutrition and has been determined to have a diagnosis/diagnoses of Moderate protein-calorie malnutrition.    This patient is being managed with:   Diet NPO-     Special Instructions for Nursing:  CODE STROKE; NPO until Dysphagia screen or Speech Bedside Swallow Evaluation completed and passed  Entered: Sep 23 2022  1:45PM

## 2022-09-26 NOTE — DISCHARGE NOTE PROVIDER - DETAILS OF MALNUTRITION DIAGNOSIS/DIAGNOSES
This patient has been assessed with a concern for Malnutrition and was treated during this hospitalization for the following Nutrition diagnosis/diagnoses:     -  09/23/2022: Moderate protein-calorie malnutrition

## 2022-09-26 NOTE — DISCHARGE NOTE PROVIDER - ATTENDING DISCHARGE PHYSICAL EXAMINATION:
General: NAD, Alert but confused  ENT: MMM, no oral thrush  Neck: Supple, No JVD  Lungs: Clear to auscultation bilaterally, non labored breathing  Cardio: S1S2 regular  Abdomen: Soft, Nontender, Nondistended; Bowel sounds present  Extremities: No cyanosis, No edema  Neuro: Alert, Non focal neuro exam, unable to follow commands

## 2022-09-26 NOTE — DISCHARGE NOTE PROVIDER - NSDCMRMEDTOKEN_GEN_ALL_CORE_FT
Aspirin Enteric Coated 81 mg oral delayed release tablet: 1 tab(s) orally once a day  atorvastatin 40 mg oral tablet: 1 tab(s) orally once a day (at bedtime)  Colace 100 mg oral capsule: 1 cap(s) orally once a day  Dulcolax Laxative 10 mg rectal suppository: 1 suppository(ies) rectal once a day, As Needed  levothyroxine 88 mcg (0.088 mg) oral tablet: 1 tab(s) orally once a day  Lotemax 0.5% ophthalmic suspension: 1 drop(s) to each affected eye 3 times a day  Metoprolol Tartrate 25 mg oral tablet: 0.5 tab(s) orally 2 times a day  OLANZapine 10 mg intramuscular injection: 1.25 milligram(s) intramuscular every 6 hours, As needed, agitation  oxybutynin 5 mg oral tablet: 1 tab(s) orally 2 times a day  Senna 8.6 mg oral tablet: 2 tab(s) orally once a day (at bedtime)  Voltaren 1% topical gel: Apply topically to affected area 2 times a day

## 2022-09-26 NOTE — CHART NOTE - NSCHARTNOTEFT_GEN_A_CORE
Nutrition Follow Up Note  Hospital Course (Per Electronic Medical Record):   Source: Medical Record [X] Nursing Staff [X]     Diet: Pureed     Patient tolerating current consistency as per SLP recommendation , Po intake appears to vary from 25-75% as per nursing flow sheet ,however  this morning patient only consumed ~ 25% as per PCA.  Patient requires total assistance for meal consumption. Dx of moderate protein calorie malnutrition noted , will add Magic cup BID  to current diet which will provide an additional 290calories & 9 gms protein per serving to current diet.     Current Weight: (9/26) 113.9/51.7kg                         (9/24) 113/51.3kg        Pertinent Medications: MEDICATIONS  (STANDING):  aspirin  chewable 81 milliGRAM(s) Oral daily  atorvastatin 40 milliGRAM(s) Oral at bedtime  enoxaparin Injectable 40 milliGRAM(s) SubCutaneous every 24 hours  influenza  Vaccine (HIGH DOSE) 0.7 milliLiter(s) IntraMuscular once  levothyroxine 88 MICROGram(s) Oral daily  metoprolol tartrate 12.5 milliGRAM(s) Oral two times a day  oxybutynin 5 milliGRAM(s) Oral two times a day  senna 2 Tablet(s) Oral at bedtime    MEDICATIONS  (PRN):  acetaminophen     Tablet .. 650 milliGRAM(s) Oral every 6 hours PRN Temp greater or equal to 38C (100.4F), Mild Pain (1 - 3)  bisacodyl Suppository 10 milliGRAM(s) Rectal daily PRN Constipation  OLANZapine Injectable 1.25 milliGRAM(s) IntraMuscular every 6 hours PRN agitation      Pertinent Labs:  09-26 Na140 mmol/L Glu 80 mg/dL K+ 3.6 mmol/L Cr  0.81 mg/dL BUN 24 mg/dL<H> 09-23 Phos 3.0 mg/dL 09-24 Alb 3.4 g/dL 09-24 Chol 241 mg/dL<H> LDL --    HDL 81 mg/dL Trig 74 mg/dL        Skin: intact    Edema: none    Last BM: (9/25) fecal incontinence noted.     Estimated Needs:   [X] No Change since Previous Assessment    Previous Nutrition Diagnosis: Moderate Protein Calorie Malnutrition     Nutrition Diagnosis is [X] Ongoing         New Nutrition Diagnosis: [X] Not Applicable      Interventions:   1. continue current diet ,   2. add Magic Cup BID     Monitoring & Evaluation: will monitor:  [X] Weights   [X] PO Intake   [X] Follow Up (Per Protocol)  [X] Tolerance to Diet Prescription       RD to follow as per Nutrition protocol  Sadie Dhillon RDN

## 2022-09-26 NOTE — DISCHARGE NOTE PROVIDER - NSDCCPCAREPLAN_GEN_ALL_CORE_FT
PRINCIPAL DISCHARGE DIAGNOSIS  Diagnosis: Acute ischemic stroke  Assessment and Plan of Treatment: you were essentially admitted with failure to thrive due to suspected stroke.  you were medically optimized and cleared neurologically and medically for discharge.

## 2022-09-26 NOTE — DISCHARGE NOTE PROVIDER - CARE PROVIDER_API CALL
Adrián Albert (MD)  Neurology  [address], [address]  [place], [place] [id number]  Phone: [phone]  Fax: [phone]  Follow Up Time:

## 2022-10-02 LAB
INSULIN FREE SERPL-ACNC: <1 UU/ML — SIGNIFICANT CHANGE UP
INSULIN: <1 UU/ML — SIGNIFICANT CHANGE UP

## 2022-12-09 ENCOUNTER — APPOINTMENT (OUTPATIENT)
Dept: CT IMAGING | Facility: HOSPITAL | Age: 87
End: 2022-12-09

## 2022-12-09 ENCOUNTER — OUTPATIENT (OUTPATIENT)
Dept: OUTPATIENT SERVICES | Facility: HOSPITAL | Age: 87
LOS: 1 days | End: 2022-12-09
Payer: MEDICARE

## 2022-12-09 DIAGNOSIS — Z00.8 ENCOUNTER FOR OTHER GENERAL EXAMINATION: ICD-10-CM

## 2022-12-09 PROBLEM — F03.90 UNSPECIFIED DEMENTIA WITHOUT BEHAVIORAL DISTURBANCE: Chronic | Status: ACTIVE | Noted: 2022-09-21

## 2022-12-09 PROBLEM — I10 ESSENTIAL (PRIMARY) HYPERTENSION: Chronic | Status: ACTIVE | Noted: 2022-09-21

## 2022-12-09 PROBLEM — F41.9 ANXIETY DISORDER, UNSPECIFIED: Chronic | Status: ACTIVE | Noted: 2022-09-21

## 2022-12-09 PROCEDURE — 72125 CT NECK SPINE W/O DYE: CPT | Mod: MF

## 2022-12-09 PROCEDURE — 70450 CT HEAD/BRAIN W/O DYE: CPT | Mod: 26,MH

## 2022-12-09 PROCEDURE — 72125 CT NECK SPINE W/O DYE: CPT | Mod: 26,MF

## 2022-12-09 PROCEDURE — G1004: CPT

## 2022-12-09 PROCEDURE — 70450 CT HEAD/BRAIN W/O DYE: CPT | Mod: MH

## 2023-05-11 NOTE — ED ADULT TRIAGE NOTE - SOURCE OF INFORMATION
well developed, over weight, well nourished , in no acute distress, normal communication ability
Patient

## 2023-05-28 NOTE — PATIENT PROFILE ADULT - PATIENT'S SEXUAL ORIENTATION
1147 Pt BP 90/58 manually in a sitting position  Pt c/o dizziness in AM but no longer  Pt c/o fatigue currently  [facility] notified  500ml bolus NS ordered per Dr Analia Murphy  RN administered fluid bolus  Heterosexual

## 2023-07-27 NOTE — ED ADULT TRIAGE NOTE - BP NONINVASIVE DIASTOLIC (MM HG)
77 no dermatitis, no environmental allergies, no food allergies, no immunosuppressive disorder, and no pruritus.